# Patient Record
Sex: MALE | Race: WHITE | NOT HISPANIC OR LATINO | Employment: UNEMPLOYED | URBAN - METROPOLITAN AREA
[De-identification: names, ages, dates, MRNs, and addresses within clinical notes are randomized per-mention and may not be internally consistent; named-entity substitution may affect disease eponyms.]

---

## 2021-03-05 ENCOUNTER — IMMUNIZATION (OUTPATIENT)
Dept: PRIMARY CARE CLINIC | Facility: CLINIC | Age: 66
End: 2021-03-05

## 2021-03-05 DIAGNOSIS — Z23 NEED FOR VACCINATION: Primary | ICD-10-CM

## 2021-03-05 PROCEDURE — 91303 PR SARSCOV2 VAC AD26 .5ML IM: ICD-10-PCS | Mod: S$GLB,,, | Performed by: INTERNAL MEDICINE

## 2021-03-05 PROCEDURE — 91303 PR SARSCOV2 VAC AD26 .5ML IM: CPT | Mod: S$GLB,,, | Performed by: INTERNAL MEDICINE

## 2021-03-05 PROCEDURE — 0031A PR IMMUNIZ ADMIN, SARS-COV-2 COVID-19 VACC, 5X10VP/0.5ML: CPT | Mod: CV19,S$GLB,, | Performed by: INTERNAL MEDICINE

## 2021-03-05 PROCEDURE — 0031A PR IMMUNIZ ADMIN, SARS-COV-2 COVID-19 VACC, 5X10VP/0.5ML: ICD-10-PCS | Mod: CV19,S$GLB,, | Performed by: INTERNAL MEDICINE

## 2023-04-05 LAB — HBA1C MFR BLD: 8.2 % (ref 4–6)

## 2024-02-19 ENCOUNTER — DOCUMENTATION ONLY (OUTPATIENT)
Dept: GASTROENTEROLOGY | Facility: CLINIC | Age: 69
End: 2024-02-19
Payer: MEDICARE

## 2024-02-19 NOTE — PROGRESS NOTES
Patient referred by Dr. Brady Escamilla for symptomatic anemia due to bleeding intestinal angioectasias. He has a 2-3 year history of anemia requiring PRBC transfusion twice during that period. He is receiving oral iron therapy but not parenteral iron therapy. Despite iron supplementation, he admits to persistent fatigue and weakness associated with anemia. Due to the potential for benefit from additional treatment for angioectasias, I will re-evaluate his case in clinic this Wednesday, Feb 21st at 9:30am including discussion of TXA trial therapy.     Kj Zee MD   [facility]

## 2024-02-20 ENCOUNTER — TELEPHONE (OUTPATIENT)
Dept: GASTROENTEROLOGY | Facility: CLINIC | Age: 69
End: 2024-02-20
Payer: MEDICARE

## 2024-02-21 ENCOUNTER — LAB VISIT (OUTPATIENT)
Dept: LAB | Facility: HOSPITAL | Age: 69
End: 2024-02-21
Attending: INTERNAL MEDICINE
Payer: MEDICARE

## 2024-02-21 ENCOUNTER — OFFICE VISIT (OUTPATIENT)
Dept: GASTROENTEROLOGY | Facility: CLINIC | Age: 69
End: 2024-02-21
Payer: MEDICARE

## 2024-02-21 VITALS
BODY MASS INDEX: 38.21 KG/M2 | HEART RATE: 60 BPM | SYSTOLIC BLOOD PRESSURE: 125 MMHG | WEIGHT: 258 LBS | HEIGHT: 69 IN | DIASTOLIC BLOOD PRESSURE: 70 MMHG

## 2024-02-21 DIAGNOSIS — D50.0 IRON DEFICIENCY ANEMIA DUE TO CHRONIC BLOOD LOSS: Primary | ICD-10-CM

## 2024-02-21 DIAGNOSIS — D50.0 IRON DEFICIENCY ANEMIA DUE TO CHRONIC BLOOD LOSS: ICD-10-CM

## 2024-02-21 LAB
ALBUMIN SERPL BCP-MCNC: 3 G/DL (ref 3.5–5.2)
ALP SERPL-CCNC: 580 U/L (ref 55–135)
ALT SERPL W/O P-5'-P-CCNC: 16 U/L (ref 10–44)
ANION GAP SERPL CALC-SCNC: 9 MMOL/L (ref 8–16)
APTT PPP: 30.5 SEC (ref 21–32)
AST SERPL-CCNC: 17 U/L (ref 10–40)
BILIRUB SERPL-MCNC: 0.3 MG/DL (ref 0.1–1)
BUN SERPL-MCNC: 38 MG/DL (ref 8–23)
CALCIUM SERPL-MCNC: 8.9 MG/DL (ref 8.7–10.5)
CHLORIDE SERPL-SCNC: 107 MMOL/L (ref 95–110)
CO2 SERPL-SCNC: 21 MMOL/L (ref 23–29)
CREAT SERPL-MCNC: 1.6 MG/DL (ref 0.5–1.4)
ERYTHROCYTE [DISTWIDTH] IN BLOOD BY AUTOMATED COUNT: 13 % (ref 11.5–14.5)
EST. GFR  (NO RACE VARIABLE): 47 ML/MIN/1.73 M^2
FERRITIN SERPL-MCNC: 274 NG/ML (ref 20–300)
GLUCOSE SERPL-MCNC: 145 MG/DL (ref 70–110)
HCT VFR BLD AUTO: 31.1 % (ref 40–54)
HGB BLD-MCNC: 10.1 G/DL (ref 14–18)
INR PPP: 1.1 (ref 0.8–1.2)
IRON SERPL-MCNC: 66 UG/DL (ref 45–160)
MCH RBC QN AUTO: 30.6 PG (ref 27–31)
MCHC RBC AUTO-ENTMCNC: 32.5 G/DL (ref 32–36)
MCV RBC AUTO: 94 FL (ref 82–98)
PLATELET # BLD AUTO: 234 K/UL (ref 150–450)
PMV BLD AUTO: 9 FL (ref 9.2–12.9)
POTASSIUM SERPL-SCNC: 5.8 MMOL/L (ref 3.5–5.1)
PROT SERPL-MCNC: 7.9 G/DL (ref 6–8.4)
PROTHROMBIN TIME: 11.4 SEC (ref 9–12.5)
RBC # BLD AUTO: 3.3 M/UL (ref 4.6–6.2)
SATURATED IRON: 22 % (ref 20–50)
SODIUM SERPL-SCNC: 137 MMOL/L (ref 136–145)
TOTAL IRON BINDING CAPACITY: 306 UG/DL (ref 250–450)
TRANSFERRIN SERPL-MCNC: 207 MG/DL (ref 200–375)
WBC # BLD AUTO: 7.43 K/UL (ref 3.9–12.7)

## 2024-02-21 PROCEDURE — 1100F PTFALLS ASSESS-DOCD GE2>/YR: CPT | Mod: CPTII,S$GLB,, | Performed by: INTERNAL MEDICINE

## 2024-02-21 PROCEDURE — 3008F BODY MASS INDEX DOCD: CPT | Mod: CPTII,S$GLB,, | Performed by: INTERNAL MEDICINE

## 2024-02-21 PROCEDURE — 99203 OFFICE O/P NEW LOW 30 MIN: CPT | Mod: S$GLB,,, | Performed by: INTERNAL MEDICINE

## 2024-02-21 PROCEDURE — 3044F HG A1C LEVEL LT 7.0%: CPT | Mod: CPTII,S$GLB,, | Performed by: INTERNAL MEDICINE

## 2024-02-21 PROCEDURE — 3288F FALL RISK ASSESSMENT DOCD: CPT | Mod: CPTII,S$GLB,, | Performed by: INTERNAL MEDICINE

## 2024-02-21 PROCEDURE — 99999 PR PBB SHADOW E&M-EST. PATIENT-LVL III: CPT | Mod: PBBFAC,,, | Performed by: INTERNAL MEDICINE

## 2024-02-21 PROCEDURE — 85027 COMPLETE CBC AUTOMATED: CPT | Performed by: INTERNAL MEDICINE

## 2024-02-21 PROCEDURE — 3074F SYST BP LT 130 MM HG: CPT | Mod: CPTII,S$GLB,, | Performed by: INTERNAL MEDICINE

## 2024-02-21 PROCEDURE — 1125F AMNT PAIN NOTED PAIN PRSNT: CPT | Mod: CPTII,S$GLB,, | Performed by: INTERNAL MEDICINE

## 2024-02-21 PROCEDURE — 85730 THROMBOPLASTIN TIME PARTIAL: CPT | Performed by: INTERNAL MEDICINE

## 2024-02-21 PROCEDURE — 83540 ASSAY OF IRON: CPT | Performed by: INTERNAL MEDICINE

## 2024-02-21 PROCEDURE — 1159F MED LIST DOCD IN RCRD: CPT | Mod: CPTII,S$GLB,, | Performed by: INTERNAL MEDICINE

## 2024-02-21 PROCEDURE — 4010F ACE/ARB THERAPY RXD/TAKEN: CPT | Mod: CPTII,S$GLB,, | Performed by: INTERNAL MEDICINE

## 2024-02-21 PROCEDURE — 81241 F5 GENE: CPT | Performed by: INTERNAL MEDICINE

## 2024-02-21 PROCEDURE — 81240 F2 GENE: CPT | Performed by: INTERNAL MEDICINE

## 2024-02-21 PROCEDURE — 85610 PROTHROMBIN TIME: CPT | Performed by: INTERNAL MEDICINE

## 2024-02-21 PROCEDURE — 80053 COMPREHEN METABOLIC PANEL: CPT | Performed by: INTERNAL MEDICINE

## 2024-02-21 PROCEDURE — 82728 ASSAY OF FERRITIN: CPT | Performed by: INTERNAL MEDICINE

## 2024-02-21 PROCEDURE — 3078F DIAST BP <80 MM HG: CPT | Mod: CPTII,S$GLB,, | Performed by: INTERNAL MEDICINE

## 2024-02-21 NOTE — PROGRESS NOTES
"[facility]      HPI 68 y.o. male with multiple medical problems presents as a referral from Dr. Escamilla for chronic severe anemia which is symptomatic due to low hemoglobin levels ranging 7 gm-10gm/dl. He complaints of SOB and OGLESBY due to anemia which is improved when his hemoglobin is above 10 gm/dl. He is not currently requiring PRBC transfusions.     Past Medical History  Osteomyelitis   DM   HTN   Remote Afib in past   CKD stage 2-3   Neurogenic bladder s/p catheter  Chronic anemia >3 years in duration attributed to GI blood loss from angioectasias observed on VCE as well as anemia of chronic inflammation as well as CKD       Physical Examination  /70 (BP Location: Left arm, Patient Position: Sitting, BP Method: Medium (Automatic))   Pulse 60   Ht 5' 9" (1.753 m)   Wt 117 kg (258 lb)   BMI 38.10 kg/m²   General appearance: alert, cooperative, no distress  Lungs: clear to auscultation bilaterally, no dullness to percussion bilaterally  Heart: regular rate and rhythm without rub; no displacement of the PMI   Abdomen: soft, non-tender; bowel sounds normoactive; no organomegaly  Lab Results   Component Value Date    WBC 7.43 02/21/2024    HGB 10.1 (L) 02/21/2024    HCT 31.1 (L) 02/21/2024    MCV 94 02/21/2024     02/21/2024       Assessment:   Chronic anemia which is fairly severe and symptomatic due to small bowel angioectasias as well as anemia of chronic inflammation and anemia of CKD     Plan:  Lab today   TXA trial therapy (patient 12-31). Detailed informed consent obtained including review of risks/benefits/process of trial   Repeat CBC in 1 month, 2 month, 3 month       Kj Zee MD   [address] [place] [address], [address]  [place] [address] [phone]    "

## 2024-02-21 NOTE — PATIENT INSTRUCTIONS
Labs today, 1st floor [facility], [facility]    Labs in 1 month, 2 month, 3 months and 6 months.

## 2024-02-26 LAB
F2 C.20210G>A GENO BLD/T: NEGATIVE
F5 P.R506Q BLD/T QL: NEGATIVE

## 2024-03-20 ENCOUNTER — TELEPHONE (OUTPATIENT)
Dept: GASTROENTEROLOGY | Facility: CLINIC | Age: 69
End: 2024-03-20
Payer: MEDICARE

## 2024-03-20 DIAGNOSIS — D50.0 IRON DEFICIENCY ANEMIA DUE TO CHRONIC BLOOD LOSS: Primary | ICD-10-CM

## 2024-03-20 NOTE — TELEPHONE ENCOUNTER
----- Message from Faby Menendez sent at 3/20/2024  3:41 PM CDT -----  Needs advice from nurse:      Who Called:pt  Regarding:pt in a research program and is suppose to have lab work done every month, there are no orders  Would the patient rather a call back or VIA [facility]?  Best Call Back number:[phone]  Additional Info:

## 2024-03-20 NOTE — TELEPHONE ENCOUNTER
Pt requesting to schedule lab appt on tomorrow, 3/21/24.  Appt scheduled with pt on Thursday, March 21, 2024 at 10am at [facility].

## 2024-03-21 ENCOUNTER — TELEPHONE (OUTPATIENT)
Dept: GASTROENTEROLOGY | Facility: CLINIC | Age: 69
End: 2024-03-21
Payer: MEDICARE

## 2024-03-21 ENCOUNTER — LAB VISIT (OUTPATIENT)
Dept: LAB | Facility: HOSPITAL | Age: 69
End: 2024-03-21
Attending: INTERNAL MEDICINE
Payer: MEDICARE

## 2024-03-21 DIAGNOSIS — D50.0 IRON DEFICIENCY ANEMIA DUE TO CHRONIC BLOOD LOSS: Primary | ICD-10-CM

## 2024-03-21 DIAGNOSIS — D50.0 IRON DEFICIENCY ANEMIA DUE TO CHRONIC BLOOD LOSS: ICD-10-CM

## 2024-03-21 LAB
BASOPHILS # BLD AUTO: 0.05 K/UL (ref 0–0.2)
BASOPHILS NFR BLD: 0.6 % (ref 0–1.9)
DIFFERENTIAL METHOD BLD: ABNORMAL
EOSINOPHIL # BLD AUTO: 0.1 K/UL (ref 0–0.5)
EOSINOPHIL NFR BLD: 1.7 % (ref 0–8)
ERYTHROCYTE [DISTWIDTH] IN BLOOD BY AUTOMATED COUNT: 13.1 % (ref 11.5–14.5)
HCT VFR BLD AUTO: 29.8 % (ref 40–54)
HGB BLD-MCNC: 9.4 G/DL (ref 14–18)
IMM GRANULOCYTES # BLD AUTO: 0.1 K/UL (ref 0–0.04)
IMM GRANULOCYTES NFR BLD AUTO: 1.2 % (ref 0–0.5)
LYMPHOCYTES # BLD AUTO: 1.4 K/UL (ref 1–4.8)
LYMPHOCYTES NFR BLD: 16.9 % (ref 18–48)
MCH RBC QN AUTO: 30.8 PG (ref 27–31)
MCHC RBC AUTO-ENTMCNC: 31.5 G/DL (ref 32–36)
MCV RBC AUTO: 98 FL (ref 82–98)
MONOCYTES # BLD AUTO: 0.8 K/UL (ref 0.3–1)
MONOCYTES NFR BLD: 9.3 % (ref 4–15)
NEUTROPHILS # BLD AUTO: 5.8 K/UL (ref 1.8–7.7)
NEUTROPHILS NFR BLD: 70.3 % (ref 38–73)
NRBC BLD-RTO: 0 /100 WBC
PLATELET # BLD AUTO: 261 K/UL (ref 150–450)
PMV BLD AUTO: 8.5 FL (ref 9.2–12.9)
RBC # BLD AUTO: 3.05 M/UL (ref 4.6–6.2)
WBC # BLD AUTO: 8.24 K/UL (ref 3.9–12.7)

## 2024-03-21 PROCEDURE — 36415 COLL VENOUS BLD VENIPUNCTURE: CPT | Performed by: INTERNAL MEDICINE

## 2024-03-21 PROCEDURE — 85025 COMPLETE CBC W/AUTO DIFF WBC: CPT | Performed by: INTERNAL MEDICINE

## 2024-03-21 NOTE — TELEPHONE ENCOUNTER
Plan of Care    Patient with minor hemoglobin decline from 10.1 to 9.4. Will reassess in one month and have patient follow up in clinic in 2 months to further evaluate need for TXA. Patient notified of the above.

## 2024-03-21 NOTE — TELEPHONE ENCOUNTER
----- Message from Kj Zee MD sent at 3/21/2024  3:56 PM CDT -----  Regarding: Clinic appointment  Hi!    Can we get Mr. Mensah scheduled in clinic in 2 months and additionally I placed an order for a CBC in one month.     Thank you!

## 2024-03-21 NOTE — TELEPHONE ENCOUNTER
Lab appt scheduled with daughter on Monday, April 22, 10am at [facility].  Clinic follow up scheduled on Wednesday, May 22, 2024 at 9am.   Daughter to contact clinic if need to reschedule.

## 2024-04-23 ENCOUNTER — TELEPHONE (OUTPATIENT)
Dept: NEUROLOGY | Facility: CLINIC | Age: 69
End: 2024-04-23
Payer: MEDICARE

## 2024-04-24 ENCOUNTER — LAB VISIT (OUTPATIENT)
Dept: LAB | Facility: HOSPITAL | Age: 69
End: 2024-04-24
Attending: INTERNAL MEDICINE
Payer: MEDICARE

## 2024-04-24 DIAGNOSIS — D50.0 IRON DEFICIENCY ANEMIA DUE TO CHRONIC BLOOD LOSS: ICD-10-CM

## 2024-04-24 LAB
ERYTHROCYTE [DISTWIDTH] IN BLOOD BY AUTOMATED COUNT: 14.1 % (ref 11.5–14.5)
HCT VFR BLD AUTO: 25.2 % (ref 40–54)
HGB BLD-MCNC: 7.8 G/DL (ref 14–18)
MCH RBC QN AUTO: 29.7 PG (ref 27–31)
MCHC RBC AUTO-ENTMCNC: 31 G/DL (ref 32–36)
MCV RBC AUTO: 96 FL (ref 82–98)
PLATELET # BLD AUTO: 208 K/UL (ref 150–450)
PMV BLD AUTO: 8.3 FL (ref 9.2–12.9)
RBC # BLD AUTO: 2.63 M/UL (ref 4.6–6.2)
WBC # BLD AUTO: 6.59 K/UL (ref 3.9–12.7)

## 2024-04-24 PROCEDURE — 85027 COMPLETE CBC AUTOMATED: CPT | Performed by: INTERNAL MEDICINE

## 2024-04-24 PROCEDURE — 36415 COLL VENOUS BLD VENIPUNCTURE: CPT | Performed by: INTERNAL MEDICINE

## 2024-05-21 ENCOUNTER — TELEPHONE (OUTPATIENT)
Dept: GASTROENTEROLOGY | Facility: CLINIC | Age: 69
End: 2024-05-21
Payer: MEDICARE

## 2024-05-21 NOTE — TELEPHONE ENCOUNTER
Clinic reminder, message placed on voicemail.  Appt with Dr. Zee on Wednesday, May 22, 2024 at 9am, [facility], [address].   yes

## 2024-05-22 ENCOUNTER — OFFICE VISIT (OUTPATIENT)
Dept: GASTROENTEROLOGY | Facility: CLINIC | Age: 69
End: 2024-05-22
Payer: MEDICARE

## 2024-05-22 ENCOUNTER — LAB VISIT (OUTPATIENT)
Dept: LAB | Facility: HOSPITAL | Age: 69
End: 2024-05-22
Attending: INTERNAL MEDICINE
Payer: MEDICARE

## 2024-05-22 VITALS
DIASTOLIC BLOOD PRESSURE: 62 MMHG | SYSTOLIC BLOOD PRESSURE: 117 MMHG | HEART RATE: 72 BPM | BODY MASS INDEX: 38.21 KG/M2 | WEIGHT: 258 LBS | HEIGHT: 69 IN

## 2024-05-22 DIAGNOSIS — D50.0 IRON DEFICIENCY ANEMIA DUE TO CHRONIC BLOOD LOSS: Primary | ICD-10-CM

## 2024-05-22 DIAGNOSIS — D50.0 IRON DEFICIENCY ANEMIA DUE TO CHRONIC BLOOD LOSS: ICD-10-CM

## 2024-05-22 LAB
ERYTHROCYTE [DISTWIDTH] IN BLOOD BY AUTOMATED COUNT: 15.1 % (ref 11.5–14.5)
FERRITIN SERPL-MCNC: 1547 NG/ML (ref 20–300)
HCT VFR BLD AUTO: 22.1 % (ref 40–54)
HGB BLD-MCNC: 6.9 G/DL (ref 14–18)
IRON SERPL-MCNC: 33 UG/DL (ref 45–160)
MCH RBC QN AUTO: 29.4 PG (ref 27–31)
MCHC RBC AUTO-ENTMCNC: 31.2 G/DL (ref 32–36)
MCV RBC AUTO: 94 FL (ref 82–98)
PLATELET # BLD AUTO: 282 K/UL (ref 150–450)
PMV BLD AUTO: 9 FL (ref 9.2–12.9)
RBC # BLD AUTO: 2.35 M/UL (ref 4.6–6.2)
SATURATED IRON: 18 % (ref 20–50)
TOTAL IRON BINDING CAPACITY: 181 UG/DL (ref 250–450)
TRANSFERRIN SERPL-MCNC: 122 MG/DL (ref 200–375)
WBC # BLD AUTO: 6.3 K/UL (ref 3.9–12.7)

## 2024-05-22 PROCEDURE — 3044F HG A1C LEVEL LT 7.0%: CPT | Mod: CPTII,S$GLB,, | Performed by: INTERNAL MEDICINE

## 2024-05-22 PROCEDURE — 82728 ASSAY OF FERRITIN: CPT | Performed by: INTERNAL MEDICINE

## 2024-05-22 PROCEDURE — 99999 PR PBB SHADOW E&M-EST. PATIENT-LVL IV: CPT | Mod: PBBFAC,,, | Performed by: INTERNAL MEDICINE

## 2024-05-22 PROCEDURE — 3288F FALL RISK ASSESSMENT DOCD: CPT | Mod: CPTII,S$GLB,, | Performed by: INTERNAL MEDICINE

## 2024-05-22 PROCEDURE — 85027 COMPLETE CBC AUTOMATED: CPT | Performed by: INTERNAL MEDICINE

## 2024-05-22 PROCEDURE — 36415 COLL VENOUS BLD VENIPUNCTURE: CPT | Performed by: INTERNAL MEDICINE

## 2024-05-22 PROCEDURE — 3074F SYST BP LT 130 MM HG: CPT | Mod: CPTII,S$GLB,, | Performed by: INTERNAL MEDICINE

## 2024-05-22 PROCEDURE — 83540 ASSAY OF IRON: CPT | Performed by: INTERNAL MEDICINE

## 2024-05-22 PROCEDURE — 3078F DIAST BP <80 MM HG: CPT | Mod: CPTII,S$GLB,, | Performed by: INTERNAL MEDICINE

## 2024-05-22 PROCEDURE — 1159F MED LIST DOCD IN RCRD: CPT | Mod: CPTII,S$GLB,, | Performed by: INTERNAL MEDICINE

## 2024-05-22 PROCEDURE — 1100F PTFALLS ASSESS-DOCD GE2>/YR: CPT | Mod: CPTII,S$GLB,, | Performed by: INTERNAL MEDICINE

## 2024-05-22 PROCEDURE — 1126F AMNT PAIN NOTED NONE PRSNT: CPT | Mod: CPTII,S$GLB,, | Performed by: INTERNAL MEDICINE

## 2024-05-22 PROCEDURE — 4010F ACE/ARB THERAPY RXD/TAKEN: CPT | Mod: CPTII,S$GLB,, | Performed by: INTERNAL MEDICINE

## 2024-05-22 PROCEDURE — 99214 OFFICE O/P EST MOD 30 MIN: CPT | Mod: S$GLB,,, | Performed by: INTERNAL MEDICINE

## 2024-05-22 PROCEDURE — 3008F BODY MASS INDEX DOCD: CPT | Mod: CPTII,S$GLB,, | Performed by: INTERNAL MEDICINE

## 2024-05-22 RX ORDER — PANTOPRAZOLE SODIUM 40 MG/1
40 TABLET, DELAYED RELEASE ORAL DAILY
COMMUNITY

## 2024-05-22 RX ORDER — INSULIN ASPART 100 [IU]/ML
INJECTION, SOLUTION INTRAVENOUS; SUBCUTANEOUS
COMMUNITY

## 2024-05-22 RX ORDER — AMLODIPINE BESYLATE 10 MG/1
10 TABLET ORAL DAILY
COMMUNITY

## 2024-05-22 RX ORDER — ASPIRIN 81 MG/1
81 TABLET ORAL DAILY
COMMUNITY

## 2024-05-22 RX ORDER — DORZOLAMIDE HCL 20 MG/ML
1 SOLUTION/ DROPS OPHTHALMIC 3 TIMES DAILY
COMMUNITY

## 2024-05-22 RX ORDER — LANOLIN ALCOHOL/MO/W.PET/CERES
1 CREAM (GRAM) TOPICAL
COMMUNITY

## 2024-05-22 RX ORDER — ERGOCALCIFEROL 1.25 MG/1
50000 CAPSULE ORAL
Status: ON HOLD | COMMUNITY
End: 2024-06-03 | Stop reason: CLARIF

## 2024-05-22 RX ORDER — DOXAZOSIN 2 MG/1
2 TABLET ORAL NIGHTLY
COMMUNITY

## 2024-05-22 RX ORDER — BRIMONIDINE TARTRATE 1 MG/ML
1 SOLUTION/ DROPS OPHTHALMIC 3 TIMES DAILY
COMMUNITY

## 2024-05-22 RX ORDER — METOPROLOL TARTRATE 100 MG/1
100 TABLET ORAL 2 TIMES DAILY
COMMUNITY

## 2024-05-22 RX ORDER — LOSARTAN POTASSIUM 25 MG/1
25 TABLET ORAL DAILY
COMMUNITY

## 2024-05-22 RX ORDER — ATORVASTATIN CALCIUM 10 MG/1
10 TABLET, FILM COATED ORAL DAILY
COMMUNITY

## 2024-05-22 NOTE — PATIENT INSTRUCTIONS
Labs today, 1st floor [facility], Patient [facility].    You are scheduled for an Upper SBE on _____Monday, Miranda 3, 2024 at [facility] at [address] [place] [address]  700____________________________    You should eat light meals the day before the procedure and nothing to eat or drink after midnight the night before your procedure.    You will need to be at the 1st floor admission desk at the hospital on __Endoscopy staff will contact to give arrival time.____________________

## 2024-05-22 NOTE — PROGRESS NOTES
"[facility]    Chief complaint: Anemia     HPI 69 y.o. male with multiple medical problems presented as a referral from Dr. Escamilla for chronic severe anemia which is symptomatic due to low hemoglobin levels ranging 7 gm-10gm/dl. He complains of SOB and OGLESBY due to anemia which is improved when his hemoglobin is above 10 gm/dl. He has not required any PRBC transfusions since starting the TXA trial. He remains on oral iron every other day. He has some issues with constipation that responds well to Miralax.     Past Medical History  Osteomyelitis   DM   HTN   Remote Afib in past   CKD stage 2-3   Neurogenic bladder s/p catheter  Chronic anemia >3 years in duration attributed to GI blood loss from angioectasias observed on VCE as well as anemia of chronic inflammation as well as CKD       Physical Examination  /62 (BP Location: Left arm, Patient Position: Sitting, BP Method: Large (Automatic))   Pulse 72   Ht 5' 9" (1.753 m)   Wt 117 kg (258 lb)   BMI 38.10 kg/m²   General appearance: alert, cooperative, no distress  Lungs: clear to auscultation bilaterally, no dullness to percussion bilaterally  Heart: regular rate and rhythm without rub; no displacement of the PMI   Abdomen: soft, non-tender; bowel sounds normoactive; no organomegaly    Labs:   Hgb 10.1 (2/21/24) -> Hgb 9.4 (3/21/2024) -> Hgb 7.8 (4/24/2024)  Ferritin 274 (2/21/24)                                    Ferritin 329 (4/9/2024)    Assessment:   Mr. Downs is a 69 year old male with symptomatic, severe chronic anemia due to small bowel angioectasias as well as anemia of chronic inflammation and anemia of CKD.  He has not required any blood transfusion since starting TXA but hemoglobin has down trended over the past 3 months which suggest he is a non-responder to TXA. (This is a chronic illness with exacerbation)    Plan:  - TXA trial therapy (patient 12-31, start date 2/21/2024). He will continue the remaining TXA (approximately 10 days " left)  - Upper Single Balloon Enteroscopy (6/3/24) with ablation of angioectasias  - Continue oral iron therapy with addition of parenteral iron if needed after SBE followed by a trial of Sandostatin 30mg IM monthly if able to get approved by insurance (previously unsuccessful)        Kj Zee MD   [address] [place] [address], [address]  [place] [address] [phone]

## 2024-06-03 ENCOUNTER — HOSPITAL ENCOUNTER (OUTPATIENT)
Facility: HOSPITAL | Age: 69
Discharge: HOME | End: 2024-06-03
Attending: INTERNAL MEDICINE | Admitting: INTERNAL MEDICINE
Payer: MEDICARE

## 2024-06-03 ENCOUNTER — ANESTHESIA (OUTPATIENT)
Dept: ENDOSCOPY | Facility: HOSPITAL | Age: 69
End: 2024-06-03
Payer: MEDICARE

## 2024-06-03 ENCOUNTER — ANESTHESIA EVENT (OUTPATIENT)
Dept: ENDOSCOPY | Facility: HOSPITAL | Age: 69
End: 2024-06-03
Payer: MEDICARE

## 2024-06-03 VITALS
DIASTOLIC BLOOD PRESSURE: 67 MMHG | WEIGHT: 255 LBS | HEART RATE: 95 BPM | HEIGHT: 69 IN | OXYGEN SATURATION: 96 % | BODY MASS INDEX: 37.77 KG/M2 | SYSTOLIC BLOOD PRESSURE: 114 MMHG | RESPIRATION RATE: 14 BRPM | TEMPERATURE: 98 F

## 2024-06-03 DIAGNOSIS — D64.9 ANEMIA: ICD-10-CM

## 2024-06-03 DIAGNOSIS — D50.0 ANEMIA DUE TO GASTROINTESTINAL BLOOD LOSS: Primary | ICD-10-CM

## 2024-06-03 LAB
BASOPHILS # BLD AUTO: 0.03 K/UL (ref 0–0.2)
BASOPHILS NFR BLD: 0.5 % (ref 0–1.9)
DIFFERENTIAL METHOD BLD: ABNORMAL
EOSINOPHIL # BLD AUTO: 0.2 K/UL (ref 0–0.5)
EOSINOPHIL NFR BLD: 2.9 % (ref 0–8)
ERYTHROCYTE [DISTWIDTH] IN BLOOD BY AUTOMATED COUNT: 15.3 % (ref 11.5–14.5)
HCT VFR BLD AUTO: 23.5 % (ref 40–54)
HGB BLD-MCNC: 7.1 G/DL (ref 14–18)
IMM GRANULOCYTES # BLD AUTO: 0.09 K/UL (ref 0–0.04)
IMM GRANULOCYTES NFR BLD AUTO: 1.4 % (ref 0–0.5)
LYMPHOCYTES # BLD AUTO: 1.5 K/UL (ref 1–4.8)
LYMPHOCYTES NFR BLD: 23.8 % (ref 18–48)
MCH RBC QN AUTO: 28.7 PG (ref 27–31)
MCHC RBC AUTO-ENTMCNC: 30.2 G/DL (ref 32–36)
MCV RBC AUTO: 95 FL (ref 82–98)
MONOCYTES # BLD AUTO: 0.6 K/UL (ref 0.3–1)
MONOCYTES NFR BLD: 8.9 % (ref 4–15)
NEUTROPHILS # BLD AUTO: 3.9 K/UL (ref 1.8–7.7)
NEUTROPHILS NFR BLD: 62.5 % (ref 38–73)
NRBC BLD-RTO: 0 /100 WBC
PLATELET # BLD AUTO: 180 K/UL (ref 150–450)
PMV BLD AUTO: 8.7 FL (ref 9.2–12.9)
POCT GLUCOSE: 138 MG/DL (ref 70–110)
RBC # BLD AUTO: 2.47 M/UL (ref 4.6–6.2)
WBC # BLD AUTO: 6.3 K/UL (ref 3.9–12.7)

## 2024-06-03 PROCEDURE — 27202087 HC PROBE, APC: Performed by: INTERNAL MEDICINE

## 2024-06-03 PROCEDURE — 63600175 PHARM REV CODE 636 W HCPCS: Performed by: NURSE ANESTHETIST, CERTIFIED REGISTERED

## 2024-06-03 PROCEDURE — 25000003 PHARM REV CODE 250: Performed by: NURSE ANESTHETIST, CERTIFIED REGISTERED

## 2024-06-03 PROCEDURE — 25000003 PHARM REV CODE 250: Performed by: INTERNAL MEDICINE

## 2024-06-03 PROCEDURE — 27201238 HC BALLOON, OVERTUBE (ANY): Performed by: INTERNAL MEDICINE

## 2024-06-03 PROCEDURE — 37000008 HC ANESTHESIA 1ST 15 MINUTES: Performed by: INTERNAL MEDICINE

## 2024-06-03 PROCEDURE — D9220A PRA ANESTHESIA: Mod: ,,, | Performed by: NURSE ANESTHETIST, CERTIFIED REGISTERED

## 2024-06-03 PROCEDURE — 85025 COMPLETE CBC W/AUTO DIFF WBC: CPT | Performed by: ANESTHESIOLOGY

## 2024-06-03 PROCEDURE — 44378 SMALL BOWEL ENDOSCOPY: CPT | Performed by: INTERNAL MEDICINE

## 2024-06-03 PROCEDURE — 36415 COLL VENOUS BLD VENIPUNCTURE: CPT | Performed by: ANESTHESIOLOGY

## 2024-06-03 PROCEDURE — 44799 UNLISTED PX SMALL INTESTINE: CPT | Performed by: INTERNAL MEDICINE

## 2024-06-03 PROCEDURE — 37000009 HC ANESTHESIA EA ADD 15 MINS: Performed by: INTERNAL MEDICINE

## 2024-06-03 RX ORDER — LIDOCAINE HYDROCHLORIDE 20 MG/ML
INJECTION, SOLUTION EPIDURAL; INFILTRATION; INTRACAUDAL; PERINEURAL
Status: DISCONTINUED | OUTPATIENT
Start: 2024-06-03 | End: 2024-06-03

## 2024-06-03 RX ORDER — PROPOFOL 10 MG/ML
VIAL (ML) INTRAVENOUS
Status: DISCONTINUED | OUTPATIENT
Start: 2024-06-03 | End: 2024-06-03

## 2024-06-03 RX ORDER — SODIUM CHLORIDE 0.9 % (FLUSH) 0.9 %
10 SYRINGE (ML) INJECTION
Status: DISCONTINUED | OUTPATIENT
Start: 2024-06-03 | End: 2024-06-03 | Stop reason: HOSPADM

## 2024-06-03 RX ORDER — SODIUM CHLORIDE 9 MG/ML
INJECTION, SOLUTION INTRAVENOUS CONTINUOUS
Status: DISCONTINUED | OUTPATIENT
Start: 2024-06-03 | End: 2024-06-03 | Stop reason: HOSPADM

## 2024-06-03 RX ADMIN — SODIUM CHLORIDE: 9 INJECTION, SOLUTION INTRAVENOUS at 08:06

## 2024-06-03 RX ADMIN — PROPOFOL 70 MG: 10 INJECTION, EMULSION INTRAVENOUS at 09:06

## 2024-06-03 RX ADMIN — PROPOFOL 40 MG: 10 INJECTION, EMULSION INTRAVENOUS at 09:06

## 2024-06-03 RX ADMIN — SODIUM CHLORIDE, SODIUM LACTATE, POTASSIUM CHLORIDE, AND CALCIUM CHLORIDE: .6; .31; .03; .02 INJECTION, SOLUTION INTRAVENOUS at 09:06

## 2024-06-03 RX ADMIN — PROPOFOL 30 MG: 10 INJECTION, EMULSION INTRAVENOUS at 09:06

## 2024-06-03 RX ADMIN — LIDOCAINE HYDROCHLORIDE 100 MG: 20 INJECTION, SOLUTION EPIDURAL; INFILTRATION; INTRACAUDAL; PERINEURAL at 09:06

## 2024-06-03 NOTE — TRANSFER OF CARE
"Anesthesia Transfer of Care Note    Patient: Rodrick Pearl    Procedure(s) Performed: Procedure(s) (LRB):  ENTEROSCOPY, PROXIMAL (N/A)    Patient location: GI    Anesthesia Type: general    Transport from OR: Transported from OR on room air with adequate spontaneous ventilation    Post pain: adequate analgesia    Post assessment: no apparent anesthetic complications and tolerated procedure well    Post vital signs: stable    Level of consciousness: awake    Nausea/Vomiting: no nausea/vomiting    Complications: none    Transfer of care protocol was followed      Last vitals: Visit Vitals  BP (!) 146/65 (BP Location: Left arm, Patient Position: Lying)   Pulse 67   Temp 36.8 °C (98.2 °F) (Temporal)   Resp 16   Ht 5' 9" (1.753 m)   Wt 115.7 kg (255 lb)   SpO2 95%   BMI 37.66 kg/m²     "

## 2024-06-03 NOTE — PROVATION PATIENT INSTRUCTIONS
Discharge Summary/Instructions after an Endoscopic Procedure  Patient Name: Rodrick Downs  Patient MRN: [id number]  Patient YOB: 1955  Monday, Miranda 3, 2024  Kj Zee MD  Dear patient,  As a result of recent federal legislation (The Federal Cures Act), you may   receive lab or pathology results from your procedure in your [facility]   account before your physician is able to contact you. Your physician or   their representative will relay the results to you with their   recommendations at their soonest availability.  Thank you,  Your health is very important to us during the Covid Crisis. Following your   procedure today, you will receive a daily text for 2 weeks asking about   signs or symptoms of Covid 19.  Please respond to this text when you   receive it so we can follow up and keep you as safe as possible.   RESTRICTIONS:  During your procedure today, you received medications for sedation.  These   medications may affect your judgment, balance and coordination.  Therefore,   for 24 hours, you have the following restrictions:   - DO NOT drive a car, operate machinery, make legal/financial decisions,   sign important papers or drink alcohol.    ACTIVITY:  Today: no heavy lifting, straining or running due to procedural   sedation/anesthesia.  The following day: return to full activity including work.  DIET:  Eat and drink normally unless instructed otherwise.     TREATMENT FOR COMMON SIDE EFFECTS:  - Mild abdominal pain, nausea, belching, bloating or excessive gas:  rest,   eat lightly and use a heating pad.  - Sore Throat: treat with throat lozenges and/or gargle with warm salt   water.  - Because air was used during the procedure, expelling large amounts of air   from your rectum or belching is normal.  - If a bowel prep was taken, you may not have a bowel movement for 1-3 days.    This is normal.  SYMPTOMS TO WATCH FOR AND REPORT TO YOUR PHYSICIAN:  1. Abdominal pain or bloating, other than gas  cramps.  2. Chest pain.  3. Back pain.  4. Signs of infection such as: chills or fever occurring within 24 hours   after the procedure.  5. Rectal bleeding, which would show as bright red, maroon, or black stools.   (A tablespoon of blood from the rectum is not serious, especially if   hemorrhoids are present.)  6. Vomiting.  7. Weakness or dizziness.  GO DIRECTLY TO THE NEAREST EMERGENCY ROOM IF YOU HAVE ANY OF THE FOLLOWING:      Difficulty breathing              Chills and/or fever over 101 F   Persistent vomiting and/or vomiting blood   Severe abdominal pain   Severe chest pain   Black, tarry stools   Bleeding- more than one tablespoon   Any other symptom or condition that you feel may need urgent attention  Your doctor recommends these additional instructions:  If any biopsies were taken, your doctors clinic will contact you in 1 to 2   weeks with any results.  - Initiate procrit injections per nephrology if not already done   - Hematology evaluation prior to any additional GI testing   - Potential future trial of empiric Octreotide if no improvement with   procrit or after hematology evaluation  - Discharge to home  - Resume previous diet and medications  - Condition stable   - The signs and symptoms of potential delayed complications were discussed   with the patient. If signs or symptoms of these complications develop, call   the [facility] at [phone].   - Return to normal activities tomorrow.  Written discharge instructions were   provided to the patient.  For questions, problems or results please call your physician - Kj Zee MD.  EMERGENCY PHONE NUMBER: [phone],  LAB RESULTS: [phone]  IF A COMPLICATION OR EMERGENCY SITUATION ARISES AND YOU ARE UNABLE TO REACH   YOUR PHYSICIAN - GO DIRECTLY TO THE EMERGENCY ROOM.  MD Kj Ling MD  6/3/2024 10:01:26 AM  This report has been verified and signed electronically.  Dear patient,  As a result of  recent federal legislation (The Federal Cures Act), you may   receive lab or pathology results from your procedure in your [facility]   account before your physician is able to contact you. Your physician or   their representative will relay the results to you with their   recommendations at their soonest availability.  Thank you,  PROVATION

## 2024-06-03 NOTE — ANESTHESIA PREPROCEDURE EVALUATION
06/03/2024  Rodrick Pearl is a 69 y.o., male.    No past medical history on file.  No past surgical history on file.        Pre-op Assessment    I have reviewed the Patient Summary Reports.     I have reviewed the Nursing Notes. I have reviewed the NPO Status.      Review of Systems  Anesthesia Hx:   History of prior surgery of interest to airway management or planning:            Denies Personal Hx of Anesthesia complications.                    Hematology/Oncology:       -- Anemia (H/H 6.9/22.3):                                  Cardiovascular:  Exercise tolerance: good                                           Pulmonary:  Pulmonary Normal                       Renal/:     Neurogenic bladder             Endocrine:        Obesity / BMI > 30      Physical Exam  General: Well nourished    Airway:  Mallampati: II   Mouth Opening: Normal  Neck ROM: Normal ROM        Anesthesia Plan  Type of Anesthesia, risks & benefits discussed:    Anesthesia Type: Gen Natural Airway  Informed Consent: Informed consent signed with the Patient and all parties understand the risks and agree with anesthesia plan.  All questions answered.   ASA Score: 3    Ready For Surgery From Anesthesia Perspective.     .

## 2024-06-03 NOTE — ANESTHESIA POSTPROCEDURE EVALUATION
Anesthesia Post Evaluation    Patient: Rodrick Pearl    Procedure(s) Performed: Procedure(s) (LRB):  ENTEROSCOPY, PROXIMAL (N/A)    Final Anesthesia Type: general      Patient location during evaluation: PACU  Patient participation: Yes- Able to Participate  Level of consciousness: awake and alert  Post-procedure vital signs: reviewed and stable  Pain management: adequate  Airway patency: patent    PONV status at discharge: No PONV  Anesthetic complications: no      Cardiovascular status: blood pressure returned to baseline  Respiratory status: unassisted  Hydration status: euvolemic                Vitals Value Taken Time   /67 06/03/24 1028   Temp 36.7 °C (98 °F) 06/03/24 0936   Pulse 95 06/03/24 1028   Resp 14 06/03/24 1028   SpO2 96 % 06/03/24 1028         Event Time   Out of Recovery 10:05:51         Pain/Gonzalo Score: Gonzalo Score: 10 (6/3/2024  9:53 AM)

## 2024-06-03 NOTE — H&P
[facility]    Chief complaint: Anemia     HPI 69 y.o. male with multiple medical problems presented as a referral from Dr. Escamilla for chronic severe anemia which is symptomatic due to low hemoglobin levels ranging 7 gm-10gm/dl. He complains of SOB and OGLESBY due to anemia which is improved when his hemoglobin is above 10 gm/dl. He has not required any PRBC transfusions since starting the TXA trial. He remains on oral iron every other day. He has some issues with constipation that responds well to Miralax.     Past Medical History  Osteomyelitis   DM   HTN   Remote Afib in past   CKD stage 2-3   Neurogenic bladder s/p catheter  Chronic anemia >3 years in duration attributed to GI blood loss from angioectasias observed on VCE as well as anemia of chronic inflammation as well as CKD       Physical Examination  General appearance: alert, cooperative, no distress  Abdomen: soft, non-tender; bowel sounds normoactive; no organomegaly    Labs:   Hgb 10.1 (2/21/24) -> Hgb 9.4 (3/21/2024) -> Hgb 7.8 (4/24/2024)  Ferritin 274 (2/21/24)                                    Ferritin 329 (4/9/2024)    Assessment:   Mr. Downs is a 69 year old male with symptomatic, severe chronic anemia due to small bowel angioectasias as well as anemia of chronic inflammation and anemia of CKD.  He has not required any blood transfusion since starting TXA but hemoglobin has down trended over the past 3 months which suggest he is a non-responder to TXA. (This is a chronic illness with exacerbation)    Plan:  - TXA trial therapy (patient 12-31, start date 2/21/2024). He will continue the remaining TXA (approximately 10 days left)  - Upper Single Balloon Enteroscopy with ablation of angioectasias  - Continue oral iron therapy with addition of parenteral iron if needed after SBE followed by a trial of Sandostatin 30mg IM monthly if able to get approved by insurance (previously unsuccessful)        Kj Zee MD   [address] [place] [address]  [address]  [place] [address]   [phone]

## 2024-06-05 ENCOUNTER — PATIENT MESSAGE (OUTPATIENT)
Dept: GASTROENTEROLOGY | Facility: CLINIC | Age: 69
End: 2024-06-05
Payer: MEDICARE

## 2024-06-07 ENCOUNTER — TELEPHONE (OUTPATIENT)
Dept: GASTROENTEROLOGY | Facility: CLINIC | Age: 69
End: 2024-06-07
Payer: MEDICARE

## 2024-06-07 NOTE — TELEPHONE ENCOUNTER
----- Message from Yeyo Jang sent at 6/3/2024  1:10 PM CDT -----  Contact: pt  .Type:  Needs Medical Advice    Who Called: pt wife Naomi    Would the patient rather a call back or a response via [facility]?  Call back   Best Call Back Number: [phone]  Additional Information: pt. Wife is calling regarding the pt. Was told to go see a  Dr. Juliana Garcias nephrologists.  Pt. Wife was told to tell the office that they need recent clinic notes, labs, and results from the procedure that was done today.  Please fax to [phone]

## 2024-06-10 PROBLEM — D50.0 ANEMIA DUE TO GASTROINTESTINAL BLOOD LOSS: Status: ACTIVE | Noted: 2024-06-10

## 2024-06-11 ENCOUNTER — TELEPHONE (OUTPATIENT)
Dept: GASTROENTEROLOGY | Facility: CLINIC | Age: 69
End: 2024-06-11
Payer: MEDICARE

## 2024-06-11 NOTE — TELEPHONE ENCOUNTER
----- Message from Haylee Salazar sent at 6/11/2024 10:24 AM CDT -----  Type:  Progress Notes, Bloodwork, and Referral     Who Called: Pt's wife   Would the patient rather a call back or a response via [facility]? Call back   Best Call Back Number: [phone]  Additional Information: Please be advised, caller states that dr who was supposed to receive information regarding pt hasn't received anything, and caller states that pt can't wait any longer for referral, progress notes, and bloodwork from procedure to be sent over and would like a call back as soon as possible

## 2024-06-11 NOTE — TELEPHONE ENCOUNTER
Call to Dr. Mayberry's office to obtain status of referral faxed to his office.  Per Sanjuanita, referral received but no dx or notes received.  Informed Sanjuanita, notes and dx faxed all together.  Referral refaxed to Sanjuanita at [phone].  Sanjuanita to respond when fax received.

## 2024-06-11 NOTE — TELEPHONE ENCOUNTER
Naomi, wife, requesting referring records to be sent to Dr. Mayberry.  Informed  referral sent to Dr. Mayberry on 6/10/24, per Dr. Mayberry's staff, all records not received.  Wife notified referral refaxed to Dr. Mayberry's office and his staff will notify this office when received.  Acknowledged understanding by wife.

## 2024-07-19 ENCOUNTER — TELEPHONE (OUTPATIENT)
Dept: HEMATOLOGY/ONCOLOGY | Facility: CLINIC | Age: 69
End: 2024-07-19
Payer: MEDICARE

## 2024-07-19 NOTE — NURSING
Nurse navigator spoke with patient 's wife for coordination of appts for patients cancer care.  Patient is self referral from [facility] and would like to transfer his care to [facility] the [facility].  Scheduled patient with first available appt with Dr. Johnny Harris Med/Onc on 7/24/24 at 4:00.  Patients wife states understanding of appt date, time and location.  Also on [facility] portal.  All questions answered and contact info given should any future questions arise.

## 2024-07-23 NOTE — PROGRESS NOTES
[facility]: New patient visit  Best Contact Phone Number(s): [phone] (home)       Cancer/Stage/TNM:    Cancer Staging   No matching staging information was found for the patient.        Reason for visit:  de divya higher volume metastatic prostate cancer    Molecular:  Germline Testing: N/A  Somatic Testing: N/A    Treatment History:   07/18/24 - Degarelix     HPI:   Rodrick Pearl is a 69 y.o. male found to have de divya high volume metastatic prostate cancer in the setting of acute on chronic anemia and abdominal pain with associated malaise and weight loss. He has been managed primarily at [facility] and started degarelix 07/18/24. He presents to medical oncology clinic for second opinion.    Over the last few days feels better than recent baseline. He has been having ongoing generalized queasiness and malaise over several years in the setting of multiple orthopedic issues. Notably had pathologic fracture of right femur in Autumn 2023. Has had ongoing intermittent constipation and diarrhea along with nausea and weight loss. Overall has lost about 40 pounds. No CP or SOB. Had chronic dry cough. No current bone or back pain.     He started degarelix 07/18/24 and received enzlutamide in the mail today. Plans to start tomorrow after education class at [facility] tomorrow.     Long standing history of multiple orthopedic issues to the rightle and is wheelchair bound. Multiple other chronic medical comorbidities. He had neurogenic bladder with suprapubic tube placement since 2022. He has CKD.  He has remote history of diabetic wound with associated osteomyelitis with multiple partial amputations of his bilateral feet. 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 He takes amlodipine, losartan, and metoprolol for HTN. He is also on doxazosin. He takes oral iron and patnoprazole. No known prior MI or stroke; has had prior episodes of pAF.      Oncology  History   Prostate cancer   10/2022 Notable Event    10/2022: New onset urinary retention following knee surgery. Ultimately rquired supraubic catheter placement 11/2022 in setting of distal penile necrosis.      2/2024 Notable Event    02/2024: Acute on chronic anemia; primarily workup up by GI     6/3/2024 Procedure    06/03/2024: Push enteroscopy - argon plasma coagulation of AVM in mid jejunum. Referrred to hematology for low blood counts     6/27/2024 Imaging Significant Findings         6/27/2024 Tumor Markers    06/27/2024: .4     7/8/2024 Biopsy         7/11/2024 Imaging Significant Findings    FDG PET CT         7/16/2024 Imaging Significant Findings         7/18/2024 -  Hormone Therapy    07/18/24: Started ADT degarelix     7/24/2024 Initial Diagnosis    Prostate cancer           Past Medical History:   Diagnosis Date    Diabetes mellitus     Hypertension          Past Surgical History:   Procedure Laterality Date    ENDOSCOPY OF PROXIMAL SMALL INTESTINE N/A 6/3/2024    Procedure: ENTEROSCOPY, PROXIMAL;  Surgeon: Kj Zee MD;  Location: [facility];  Service: Endoscopy;  Laterality: N/A;  upper SBE    FEMUR FRACTURE SURGERY Right     TOE AMPUTATION Left     Great toe    TOE AMPUTATION Right     little toe    TONSILLECTOMY      TOTAL KNEE ARTHROPLASTY Right          Review of patient's allergies indicates:   Allergen Reactions    Rhopressa [netarsudil] Other (See Comments)         Current Outpatient Medications   Medication Sig Dispense Refill    amLODIPine (NORVASC) 10 MG tablet Take 10 mg by mouth once daily.      aspirin (ECOTRIN) 81 MG EC tablet Take 81 mg by mouth once daily.      atorvastatin (LIPITOR) 10 MG tablet Take 10 mg by mouth once daily.      brimonidine 0.1% (ALPHAGAN P) 0.1 % Drop Place 1 drop into both eyes 3 (three) times daily.      dorzolamide (TRUSOPT) 2 % ophthalmic solution 1 drop 3 (three) times daily.      doxazosin (CARDURA) 2 MG tablet Take 2 mg by mouth every  evening.      empagliflozin (JARDIANCE) 25 mg tablet Take 25 mg by mouth once daily.      ferrous sulfate (FEOSOL) Tab tablet Take 1 tablet by mouth daily with breakfast.      insulin aspart U-100 (NOVOLOG) 100 unit/mL injection Inject into the skin 3 (three) times daily before meals.      insulin degludec (TRESIBA FLEXTOUCH U-200 SUBQ) Inject into the skin.      losartan (COZAAR) 25 MG tablet Take 25 mg by mouth once daily.      metoprolol tartrate (LOPRESSOR) 100 MG tablet Take 100 mg by mouth 2 (two) times daily.      pantoprazole (PROTONIX) 40 MG tablet Take 40 mg by mouth once daily.       No current facility-administered medications for this visit.        Objective:      Physical Exam:   BP (!) 123/58 (BP Location: Left arm, Patient Position: Sitting, BP Method: Large (Automatic))   Pulse 67   Temp 97 °F (36.1 °C) (Temporal)   Wt 95.5 kg (210 lb 8 oz)   SpO2 99%   BMI 31.09 kg/m²       ECOG Performance status: (3) Capable of limited self-care, confined to bed or chair > 50% of waking hours     Physical Exam  Constitutional:       General: He is not in acute distress.     Appearance: Normal appearance.      Comments: Presents in wheel chair   HENT:      Head: Normocephalic.   Eyes:      General: No scleral icterus.     Extraocular Movements: Extraocular movements intact.      Conjunctiva/sclera: Conjunctivae normal.   Cardiovascular:      Rate and Rhythm: Normal rate.      Heart sounds: No murmur heard.  Pulmonary:      Effort: Pulmonary effort is normal. No respiratory distress.   Abdominal:      General: There is no distension.      Palpations: Abdomen is soft.   Genitourinary:     Comments: SPT in place  Skin:     General: Skin is warm and dry.   Neurological:      Mental Status: He is alert and oriented to person, place, and time.      Motor: Weakness present.   Psychiatric:         Mood and Affect: Mood normal.         Behavior: Behavior normal.         Thought Content: Thought content normal.         "  Recent Labs:   Lab Results   Component Value Date    WBC 6.30 06/03/2024    RBC 2.47 (L) 06/03/2024    HGB 7.1 (L) 06/03/2024    HCT 23.5 (L) 06/03/2024    MCV 95 06/03/2024    MCH 28.7 06/03/2024    MCHC 30.2 (L) 06/03/2024    RDW 15.3 (H) 06/03/2024     06/03/2024    MPV 8.7 (L) 06/03/2024    IMMGR 1.4 (H) 06/03/2024    GRAN 3.9 06/03/2024    GRAN 62.5 06/03/2024    IGABS 0.09 (H) 06/03/2024    LYMPH 1.5 06/03/2024    LYMPH 23.8 06/03/2024    MONO 0.6 06/03/2024    MONO 8.9 06/03/2024    EOS 0.2 06/03/2024    BASO 0.03 06/03/2024    NRBC 0 06/03/2024    EOSINOPHIL 2.9 06/03/2024    BASOPHIL 0.5 06/03/2024    DIFFMETHOD Automated 06/03/2024       Lab Results   Component Value Date     02/21/2024    K 5.2 07/20/2024     02/21/2024    CO2 21 (L) 02/21/2024     (H) 02/21/2024    BUN 38 (H) 02/21/2024    CREATININE 1.6 (H) 02/21/2024    CALCIUM 8.9 02/21/2024    PROT 7.9 02/21/2024    ALBUMIN 3.0 (L) 02/21/2024    BILITOT 0.3 02/21/2024    ALKPHOS 580 (H) 02/21/2024    AST 17 02/21/2024    ALT 16 02/21/2024    ANIONGAP 9 02/21/2024    EGFRNORACEVR 47 (A) 02/21/2024        No results found for: "PSADIAG", "PSATOTAL", "PSA"     Cardiovascular Screening:  Primary care physician: Asif Hall MD      The ASCVD Risk score (Taco WILSON, et al., 2019) failed to calculate for the following reasons:    The valid total cholesterol range is 130 to 320 mg/dL    ASCVD Risk Level: N/A    EKG: No results found for this or any previous visit.    High blood pressure:  Antihypertensive agents: amLODIPine - 10 MG  doxazosin - 2 MG  losartan - 25 MG  metoprolol tartrate - 100 MG      DM2:  Antidiabetic agents: empagliflozin - 25 mg  insulin aspart U-100 - 100 unit/mL  TRESIBA FLEXTOUCH U-200 SUBQ      Antilipid therapy:  Lipid lowering agents: [unfilled]      Antiplatelet therapy:  Agent: aspirin - 81 MG     Body mass index is 31.09 kg/m².    Lab Results   Component Value Date    CHOL 111 04/09/2024 " "   LDLCALC 64 04/09/2024    HDL 22 (L) 04/09/2024    TRIG 123 04/09/2024    HGBA1C 5.6 04/09/2024          Bone Health    No results found for: "[id number]", "[id number]"     No results found for this or any previous visit.         Staging Imaging     No results found for this or any previous visit.       No results found for this or any previous visit.      No results found for this or any previous visit.       No results found for this or any previous visit.       I have personally reviewed the above imaging.     Path:   Reviewed pathology as documented above.    Genomic testing:     Germline genetic testing  No results found for this or any previous visit.     Somatic tumor genotyping:      ctDNA genotyping:       Diagnoses:     1. Prostate cancer    2. Hypertension, unspecified type    3. Paroxysmal atrial fibrillation    4. Neurogenic bladder    5. Type 2 diabetes mellitus with other circulatory complication, with long-term current use of insulin    6. Anemia due to gastrointestinal blood loss          Assessment and Plan:     1. Prostate cancer  Overview:  de divya high volume metastatic prostate cancer in the setting of acute on chronic anemia and abdominal pain with associated malaise and weight loss. He has been managed primarily at [facility] and started degarelix 07/18/24.    Assessment & Plan:  Agree with upfront AR antagonist with ADT; plans to start enzalutamide tomorrow. Likely poor candidate for triplet therapy with docetaxel.     Would recommend getting PSMA PET CT rather than prior FDG PET. Also recommend sending molecular testing with somatic NGS (potentially from bone marrow biopsy) in addition to germline genetic testing.    He has had recent bone mineral density test. Has moderate risk osteopenia. Discussed role of Ca/D and may consider early use of antiresorptive therapy given recent femur fracture.    He is contemplating where he will receive his care. Will continue to follow at [facility] for now. He " will let us know if he would like to transfer care.       2. Hypertension, unspecified type  Overview:  Home medications include amlodipine, losartan, and metoprolol      3. Paroxysmal atrial fibrillation  Overview:  Home medications include ASA and metoprolol. Low volume burden.      4. Neurogenic bladder  Overview:  Has chronic suprapubic cathter in place; follows with urology      5. Type 2 diabetes mellitus with other circulatory complication, with long-term current use of insulin  Overview:  Home medications include Tresiba, jardiance, and novolog. Prior partial foot amputations      6. Anemia due to gastrointestinal blood loss  Assessment & Plan:  Ferritin currently elevated, likely multifactorial in setting of metastatic prostate cancer, chronic inflammation, and prior blood loss. Remaisn on po iron.              Follow up:   Route Chart for Scheduling    Med Onc Chart Routing      Follow up with physician No follow up needed.   Follow up with [facility]    Infusion scheduling note    Injection scheduling note    Labs    Imaging    Pharmacy appointment    Other referrals                           The above information has been reviewed with the patient and all questions have been answered to their apparent satisfaction.  They understand that they can call the clinic with any questions.    Johnny Harris MD MPH  Staff Physician     [facility]  [address] [place] [id number]  Email: [email or web address]  Phone: o) [phone] (c) [phone]

## 2024-07-24 ENCOUNTER — OFFICE VISIT (OUTPATIENT)
Dept: HEMATOLOGY/ONCOLOGY | Facility: CLINIC | Age: 69
End: 2024-07-24
Payer: MEDICARE

## 2024-07-24 VITALS
DIASTOLIC BLOOD PRESSURE: 58 MMHG | OXYGEN SATURATION: 99 % | BODY MASS INDEX: 31.09 KG/M2 | HEART RATE: 67 BPM | SYSTOLIC BLOOD PRESSURE: 123 MMHG | WEIGHT: 210.5 LBS | TEMPERATURE: 97 F

## 2024-07-24 DIAGNOSIS — I10 HYPERTENSION, UNSPECIFIED TYPE: ICD-10-CM

## 2024-07-24 DIAGNOSIS — N31.9 NEUROGENIC BLADDER: ICD-10-CM

## 2024-07-24 DIAGNOSIS — I48.0 PAROXYSMAL ATRIAL FIBRILLATION: ICD-10-CM

## 2024-07-24 DIAGNOSIS — D50.0 ANEMIA DUE TO GASTROINTESTINAL BLOOD LOSS: ICD-10-CM

## 2024-07-24 DIAGNOSIS — C61 PROSTATE CANCER: Primary | ICD-10-CM

## 2024-07-24 DIAGNOSIS — Z79.4 TYPE 2 DIABETES MELLITUS WITH OTHER CIRCULATORY COMPLICATION, WITH LONG-TERM CURRENT USE OF INSULIN: ICD-10-CM

## 2024-07-24 DIAGNOSIS — E11.59 TYPE 2 DIABETES MELLITUS WITH OTHER CIRCULATORY COMPLICATION, WITH LONG-TERM CURRENT USE OF INSULIN: ICD-10-CM

## 2024-07-24 PROCEDURE — 3044F HG A1C LEVEL LT 7.0%: CPT | Mod: CPTII,S$GLB,, | Performed by: HOSPITALIST

## 2024-07-24 PROCEDURE — 3078F DIAST BP <80 MM HG: CPT | Mod: CPTII,S$GLB,, | Performed by: HOSPITALIST

## 2024-07-24 PROCEDURE — 1126F AMNT PAIN NOTED NONE PRSNT: CPT | Mod: CPTII,S$GLB,, | Performed by: HOSPITALIST

## 2024-07-24 PROCEDURE — 1159F MED LIST DOCD IN RCRD: CPT | Mod: CPTII,S$GLB,, | Performed by: HOSPITALIST

## 2024-07-24 PROCEDURE — 4010F ACE/ARB THERAPY RXD/TAKEN: CPT | Mod: CPTII,S$GLB,, | Performed by: HOSPITALIST

## 2024-07-24 PROCEDURE — 3074F SYST BP LT 130 MM HG: CPT | Mod: CPTII,S$GLB,, | Performed by: HOSPITALIST

## 2024-07-24 PROCEDURE — 99205 OFFICE O/P NEW HI 60 MIN: CPT | Mod: S$GLB,,, | Performed by: HOSPITALIST

## 2024-07-24 PROCEDURE — 99999 PR PBB SHADOW E&M-EST. PATIENT-LVL V: CPT | Mod: PBBFAC,,, | Performed by: HOSPITALIST

## 2024-07-24 PROCEDURE — 1101F PT FALLS ASSESS-DOCD LE1/YR: CPT | Mod: CPTII,S$GLB,, | Performed by: HOSPITALIST

## 2024-07-24 PROCEDURE — 3008F BODY MASS INDEX DOCD: CPT | Mod: CPTII,S$GLB,, | Performed by: HOSPITALIST

## 2024-07-24 PROCEDURE — 3288F FALL RISK ASSESSMENT DOCD: CPT | Mod: CPTII,S$GLB,, | Performed by: HOSPITALIST

## 2024-07-24 NOTE — ASSESSMENT & PLAN NOTE
Agree with upfront AR antagonist with ADT; plans to start enzalutamide tomorrow. Likely poor candidate for triplet therapy with docetaxel.     Would recommend getting PSMA PET CT rather than prior FDG PET. Also recommend sending molecular testing with somatic NGS (potentially from bone marrow biopsy) in addition to germline genetic testing.    He has had recent bone mineral density test. Has moderate risk osteopenia. Discussed role of Ca/D and may consider early use of antiresorptive therapy given recent femur fracture.    He is contemplating where he will receive his care. Will continue to follow at [facility] for now. He will let us know if he would like to transfer care.

## 2024-07-24 NOTE — ASSESSMENT & PLAN NOTE
Ferritin currently elevated, likely multifactorial in setting of metastatic prostate cancer, chronic inflammation, and prior blood loss. Remaisn on po iron.

## 2024-07-24 NOTE — PATIENT INSTRUCTIONS
We discussed your recent diagnosis of a metastatic prostate cancer. By report the cancer has spread to several sites throughout the skeleton. You have started hormonal therapy through Dr. Claudio's office with androgen deprivation shots and planning to start enzalutamide treatment. I agree with this up front strategy. Some may consider use of docetaxel based chemotherapy, but I also agree with holding off for now.    We discussed typical side effects of hormone therapy including fatigue, weight gain, loss libido, forgetfulness, hot flashes, and muscle loss. Other longer term risks can include increased risk of cardiovascular disease and osteoporosis.    Recommend you continue on vitamin D 1000 IU daily to promote bone health.    Also recommend genetic testing to see if you inherited a prostate cancer gene, this information can be helpful in planning future treatments. Would also consdier sending DNA analysis of the cancer itself to identify potential targets of therapy.     You can elect to continue your treatment at [facility] or here, whichever makes you more comfortable. Would encourage you to follow up with Dr. Claudio's clinic for planned education tomorrow.    If you would like us to take over certain aspects of your care, let us know and we can arrange, otherwise would leave plans in place at [facility] for now.

## 2024-09-09 PROBLEM — D50.0 ANEMIA DUE TO GASTROINTESTINAL BLOOD LOSS: Status: RESOLVED | Noted: 2024-06-10 | Resolved: 2024-09-09

## 2024-10-07 ENCOUNTER — PATIENT MESSAGE (OUTPATIENT)
Dept: FAMILY MEDICINE | Facility: CLINIC | Age: 69
End: 2024-10-07

## 2024-10-07 ENCOUNTER — OFFICE VISIT (OUTPATIENT)
Dept: FAMILY MEDICINE | Facility: CLINIC | Age: 69
End: 2024-10-07
Payer: MEDICARE

## 2024-10-07 VITALS
BODY MASS INDEX: 34.5 KG/M2 | HEART RATE: 68 BPM | OXYGEN SATURATION: 98 % | DIASTOLIC BLOOD PRESSURE: 60 MMHG | SYSTOLIC BLOOD PRESSURE: 104 MMHG | WEIGHT: 232.94 LBS | TEMPERATURE: 98 F | HEIGHT: 69 IN

## 2024-10-07 DIAGNOSIS — R79.9 ABNORMAL FINDING OF BLOOD CHEMISTRY, UNSPECIFIED: ICD-10-CM

## 2024-10-07 DIAGNOSIS — Z79.4 TYPE 2 DIABETES MELLITUS WITH OTHER CIRCULATORY COMPLICATION, WITH LONG-TERM CURRENT USE OF INSULIN: Primary | ICD-10-CM

## 2024-10-07 DIAGNOSIS — E11.3313 TYPE 2 DIABETES MELLITUS WITH BOTH EYES AFFECTED BY MODERATE NONPROLIFERATIVE RETINOPATHY AND MACULAR EDEMA, WITH LONG-TERM CURRENT USE OF INSULIN: ICD-10-CM

## 2024-10-07 DIAGNOSIS — E11.59 TYPE 2 DIABETES MELLITUS WITH OTHER CIRCULATORY COMPLICATION, WITH LONG-TERM CURRENT USE OF INSULIN: Primary | ICD-10-CM

## 2024-10-07 DIAGNOSIS — Z79.4 TYPE 2 DIABETES MELLITUS WITH BOTH EYES AFFECTED BY MODERATE NONPROLIFERATIVE RETINOPATHY AND MACULAR EDEMA, WITH LONG-TERM CURRENT USE OF INSULIN: ICD-10-CM

## 2024-10-07 DIAGNOSIS — Z23 NEED FOR INFLUENZA VACCINATION: ICD-10-CM

## 2024-10-07 DIAGNOSIS — H40.1130 PRIMARY OPEN ANGLE GLAUCOMA OF BOTH EYES, UNSPECIFIED GLAUCOMA STAGE: ICD-10-CM

## 2024-10-07 DIAGNOSIS — Z00.00 ROUTINE ADULT HEALTH MAINTENANCE: ICD-10-CM

## 2024-10-07 DIAGNOSIS — I73.9 PERIPHERAL VASCULAR DISEASE: ICD-10-CM

## 2024-10-07 DIAGNOSIS — E11.610: ICD-10-CM

## 2024-10-07 DIAGNOSIS — C61 PROSTATE CANCER METASTATIC TO MULTIPLE SITES: ICD-10-CM

## 2024-10-07 DIAGNOSIS — I10 PRIMARY HYPERTENSION: ICD-10-CM

## 2024-10-07 PROCEDURE — 3074F SYST BP LT 130 MM HG: CPT | Mod: CPTII,S$GLB,, | Performed by: INTERNAL MEDICINE

## 2024-10-07 PROCEDURE — 99205 OFFICE O/P NEW HI 60 MIN: CPT | Mod: S$GLB,,, | Performed by: INTERNAL MEDICINE

## 2024-10-07 PROCEDURE — 3078F DIAST BP <80 MM HG: CPT | Mod: CPTII,S$GLB,, | Performed by: INTERNAL MEDICINE

## 2024-10-07 PROCEDURE — 3008F BODY MASS INDEX DOCD: CPT | Mod: CPTII,S$GLB,, | Performed by: INTERNAL MEDICINE

## 2024-10-07 PROCEDURE — 90653 IIV ADJUVANT VACCINE IM: CPT | Mod: S$GLB,,, | Performed by: INTERNAL MEDICINE

## 2024-10-07 PROCEDURE — 99999 PR PBB SHADOW E&M-EST. PATIENT-LVL III: CPT | Mod: PBBFAC,,, | Performed by: INTERNAL MEDICINE

## 2024-10-07 PROCEDURE — G2211 COMPLEX E/M VISIT ADD ON: HCPCS | Mod: S$GLB,,, | Performed by: INTERNAL MEDICINE

## 2024-10-07 PROCEDURE — 4010F ACE/ARB THERAPY RXD/TAKEN: CPT | Mod: CPTII,S$GLB,, | Performed by: INTERNAL MEDICINE

## 2024-10-07 PROCEDURE — 1126F AMNT PAIN NOTED NONE PRSNT: CPT | Mod: CPTII,S$GLB,, | Performed by: INTERNAL MEDICINE

## 2024-10-07 PROCEDURE — 3288F FALL RISK ASSESSMENT DOCD: CPT | Mod: CPTII,S$GLB,, | Performed by: INTERNAL MEDICINE

## 2024-10-07 PROCEDURE — G0008 ADMIN INFLUENZA VIRUS VAC: HCPCS | Mod: S$GLB,,, | Performed by: INTERNAL MEDICINE

## 2024-10-07 PROCEDURE — 1101F PT FALLS ASSESS-DOCD LE1/YR: CPT | Mod: CPTII,S$GLB,, | Performed by: INTERNAL MEDICINE

## 2024-10-07 PROCEDURE — 3044F HG A1C LEVEL LT 7.0%: CPT | Mod: CPTII,S$GLB,, | Performed by: INTERNAL MEDICINE

## 2024-10-07 RX ORDER — ERGOCALCIFEROL 1.25 MG/1
1 CAPSULE ORAL
COMMUNITY
Start: 2023-10-26 | End: 2024-10-25

## 2024-10-07 RX ORDER — METOPROLOL TARTRATE 50 MG/1
50 TABLET ORAL 2 TIMES DAILY
Start: 2024-10-07 | End: 2025-10-07

## 2024-10-07 RX ORDER — SODIUM BICARBONATE 650 MG/1
1 TABLET ORAL EVERY 8 HOURS
COMMUNITY
Start: 2024-08-12 | End: 2025-08-12

## 2024-10-07 RX ORDER — PREDNISONE 5 MG/1
1 TABLET ORAL 2 TIMES DAILY
COMMUNITY

## 2024-10-07 RX ORDER — AMLODIPINE BESYLATE 10 MG/1
10 TABLET ORAL DAILY
Qty: 90 TABLET | Refills: 3 | Status: SHIPPED | OUTPATIENT
Start: 2024-10-07

## 2024-10-07 RX ORDER — ENZALUTAMIDE 40 MG/1
2 TABLET ORAL 2 TIMES DAILY
COMMUNITY
Start: 2024-09-10

## 2024-10-07 RX ORDER — LOSARTAN POTASSIUM 25 MG/1
25 TABLET ORAL DAILY
Qty: 9 TABLET | Refills: 3 | Status: SHIPPED | OUTPATIENT
Start: 2024-10-07

## 2024-10-07 NOTE — PROGRESS NOTES
Subjective:     Chief Complaint   Patient presents with    Establish Care       HPI  Rodrick Pearl is a 69 y.o. male with medical diagnoses as listed in the medical history and problem list that presents for above complaint(s).    Establishing Care with [facility]    Metastatic Prostate Cancer (diagnosed in June 2024)  -Followed by Dr. Joe Claudio at [facility] Heme/Onc  -Triple Therapy/Chemo, pain well controlled, no N/V  -Associated anemia    T2DM  -Insulin: 40U nightly, 8-16 U before meals  -Jardiance  -Neuropathy: Loss of feeling in hands and feet  -Eyes: Retinopathy; has regular retina specialist  -Foot: No sensation in either feet    HTN  -Amlodipine 10mg, Cozaar, Lopressor  -Home BP: 130-140/60s    HLD  -Lipitor 10mg    Social  -Retired  -Difficulty walking, will return to PT  -Never smoker  -Social drinker in past    Screening  -Planning to get flu and COVID booster  -Shingles done at [facility] 2-3 years ago  -Colonoscopy 2 years ago    Patient Care Team:  Edgar Hager MD as PCP - General (Internal Medicine)  Sasha Mccoy, RN as Oncology Navigator (Surgery)  Johnny Harris MD as Consulting Physician (Hematology and Oncology)      PAST MEDICAL HISTORY:  Past Medical History:   Diagnosis Date    Diabetes mellitus     Hypertension        PAST SURGICAL HISTORY:  Past Surgical History:   Procedure Laterality Date    ENDOSCOPY OF PROXIMAL SMALL INTESTINE N/A 6/3/2024    Procedure: ENTEROSCOPY, PROXIMAL;  Surgeon: Kj Zee MD;  Location: [facility];  Service: Endoscopy;  Laterality: N/A;  upper SBE    FEMUR FRACTURE SURGERY Right     TOE AMPUTATION Left     Great toe    TOE AMPUTATION Right     little toe    TONSILLECTOMY      TOTAL KNEE ARTHROPLASTY Right        SOCIAL HISTORY:  Social History     Socioeconomic History    Marital status: Unknown   Tobacco Use    Smoking status: Never    Smokeless tobacco: Never   Substance and Sexual Activity    Alcohol use: Not Currently    Drug use: Never    Social History Narrative    Lives in [place] with his wife, Naomi. Also with son Kaz and daughter Althea. Previously worked as .        FAMILY HISTORY:  Family History   Problem Relation Name Age of Onset    Lung cancer Father      Kidney cancer Sister      Breast cancer Sister radha     Prostate cancer Paternal Uncle      Lung cancer Paternal Uncle      Pancreatic cancer Paternal Aunt         ALLERGIES AND MEDICATIONS: updated and reviewed.  Review of patient's allergies indicates:   Allergen Reactions    Rhopressa [netarsudil] Other (See Comments)     Current Outpatient Medications   Medication Sig Dispense Refill    ergocalciferol (ERGOCALCIFEROL) 50,000 unit Cap Take 1 capsule by mouth every 7 days.      sodium bicarbonate 650 MG tablet Take 1 tablet by mouth every 8 (eight) hours.      XTANDI 40 mg Tab Take 2 tablets by mouth 2 (two) times daily.      amLODIPine (NORVASC) 10 MG tablet Take 1 tablet (10 mg total) by mouth once daily. 90 tablet 3    aspirin (ECOTRIN) 81 MG EC tablet Take 81 mg by mouth once daily.      atorvastatin (LIPITOR) 10 MG tablet Take 10 mg by mouth once daily.      brimonidine 0.1% (ALPHAGAN P) 0.1 % Drop Place 1 drop into both eyes 3 (three) times daily.      dorzolamide (TRUSOPT) 2 % ophthalmic solution 1 drop 3 (three) times daily.      doxazosin (CARDURA) 2 MG tablet Take 2 mg by mouth every evening.      empagliflozin (JARDIANCE) 25 mg tablet Take 25 mg by mouth once daily.      ferrous sulfate (FEOSOL) Tab tablet Take 1 tablet by mouth daily with breakfast.      insulin aspart U-100 (NOVOLOG) 100 unit/mL injection Inject into the skin 3 (three) times daily before meals.      insulin degludec (TRESIBA FLEXTOUCH U-200 SUBQ) Inject into the skin.      losartan (COZAAR) 25 MG tablet Take 1 tablet (25 mg total) by mouth once daily. 9 tablet 3    metoprolol tartrate (LOPRESSOR) 50 MG tablet Take 1 tablet (50 mg total) by mouth 2 (two) times daily.       "pantoprazole (PROTONIX) 40 MG tablet Take 40 mg by mouth once daily.      predniSONE (DELTASONE) 5 MG tablet Take 1 tablet by mouth 2 (two) times daily.       No current facility-administered medications for this visit.         Objective:       Physical Exam  Vitals:    10/07/24 1029   BP: 104/60   Pulse: 68   Temp: 97.9 °F (36.6 °C)   TempSrc: Oral   SpO2: 98%   Weight: 105.7 kg (232 lb 14.7 oz)   Height: 5' 9" (1.753 m)    Body mass index is 34.4 kg/m².  Weight: 105.7 kg (232 lb 14.7 oz)   Height: 5' 9" (175.3 cm)   Physical Exam  Constitutional:       Appearance: Normal appearance. He is obese. He is not ill-appearing.   HENT:      Head: Normocephalic and atraumatic.      Right Ear: Tympanic membrane and ear canal normal.      Left Ear: Tympanic membrane and ear canal normal.      Nose: Nose normal.      Mouth/Throat:      Mouth: Mucous membranes are moist.   Eyes:      Extraocular Movements: Extraocular movements intact.      Pupils: Pupils are equal, round, and reactive to light.   Cardiovascular:      Rate and Rhythm: Normal rate and regular rhythm.      Pulses: Normal pulses.      Heart sounds: Normal heart sounds. No murmur heard.  Pulmonary:      Effort: Pulmonary effort is normal.      Breath sounds: Normal breath sounds.   Genitourinary:     Comments: Suprapubic Catheter in place  Musculoskeletal:         General: Swelling (B/l pitting edema to mid-calf) present.      Cervical back: Normal range of motion and neck supple. No tenderness.      Right knee: Decreased range of motion.      Left knee: Normal.      Right foot: Deformity and Charcot foot present.      Left foot: Deformity and Charcot foot present.      Comments: Large surgical scar on right knee and thigh   Feet:      Comments: Hx of toe amputations and b/l neuropathy  Skin:     General: Skin is warm and dry.      Capillary Refill: Capillary refill takes 2 to 3 seconds.   Neurological:      Mental Status: He is alert and oriented to person, " place, and time.      Sensory: Sensory deficit (B/l loss of sensation in feet) present.      Comments: In mobility scooter   Psychiatric:         Mood and Affect: Mood normal.         Behavior: Behavior normal.             Assessment:     1. Type 2 diabetes mellitus with other circulatory complication, with long-term current use of insulin    2. Charcot joint of foot due to diabetes    3. Primary hypertension    4. Prostate cancer metastatic to multiple sites    5. Routine adult health maintenance    6. Primary open angle glaucoma of both eyes, unspecified glaucoma stage    7. Abnormal finding of blood chemistry, unspecified    8. Need for influenza vaccination    9. Type 2 diabetes mellitus with both eyes affected by moderate nonproliferative retinopathy and macular edema, with long-term current use of insulin    10. Peripheral vascular disease      Plan:     Rodrick was seen today for establish care. Patient is medically complex and high risk of medical complications and/or morbidity secondary to one or several of the problems listed below:    Diagnoses and all orders for this visit:    Type 2 diabetes mellitus with other circulatory complication, with long-term current use of insulin  Charcot joint of foot due to diabetes  Type 2 diabetes mellitus with both eyes affected by moderate nonproliferative retinopathy and macular edema, with long-term current use of insulin  Reviewed   The current medical regimen is effective;  continue present plan and medications.  -     HEMOGLOBIN A1C; Future  -     Foot Exam Performed    Primary hypertension  BP controlled presently - reviewed anti-hypertensive regimen - continue current therapy  -     metoprolol tartrate (LOPRESSOR) 50 MG tablet; Take 1 tablet (50 mg total) by mouth 2 (two) times daily.  -     amLODIPine (NORVASC) 10 MG tablet; Take 1 tablet (10 mg total) by mouth once daily.  -     losartan (COZAAR) 25 MG tablet; Take 1 tablet (25 mg total) by mouth once  daily.    Prostate cancer metastatic to multiple sites  Discussed currently cancer pharmacotherapy per [facility], second opinion with [facility] performed recently  Continue Oncology follow-up    Routine adult health maintenance  Discussed healthy diet, regular exercise, necessary labs, age appropriate cancer screening, and routine vaccinations.    -     HEMOGLOBIN A1C; Future  -     LIPID PANEL; Future    Primary open angle glaucoma of both eyes, unspecified glaucoma stage  Following with Ophtho    Need for influenza vaccination  Counseled regarding seasonal influenza vaccination - administered  -     influenza (adjuvanted) (Fluad) 45 mcg/0.5 mL IM vaccine (> or = 64 yo) 0.5 mL      Peripheral vascular disease  The current medical regimen is effective;  continue present plan and medications.      Health Maintenance reviewed, addressed as per orders    I spent a total of 60 minutes on the day of the visit.This includes face to face time and non-face to face time preparing to see the patient (eg, review of tests), obtaining and/or reviewing separately obtained history, documenting clinical information in the electronic or other health record, independently interpreting results and communicating results to the patient/family/caregiver, or care coordinator.    Visit today included increased complexity associated with the care of the episodic problems addressed and managing the longitudinal care of the patient due to the serious and/or complex managed problem(s) as per assessment/plan.       F/u in 4 months      1. The patient indicates understanding of these issues and agrees with the plan. Brief care plan is updated and reviewed with the patient as applicable.   2. The patient is given an After Visit Summary that lists all medications with directions, allergies, orders placed during this encounter and follow-up instructions.   3. I have reviewed the patient's medical information including past medical,  family, and social history sections including the medications and allergies.   4. We discussed the patient's current medications. I reconciled the patient's medication list and prepared and supplied needed refills.       Edgar Hager MD  Internal Medicine-Pediatrics

## 2024-10-09 PROBLEM — Z79.4 TYPE 2 DIABETES MELLITUS WITH BOTH EYES AFFECTED BY MODERATE NONPROLIFERATIVE RETINOPATHY AND MACULAR EDEMA, WITH LONG-TERM CURRENT USE OF INSULIN: Status: ACTIVE | Noted: 2024-10-09

## 2024-10-09 PROBLEM — I73.9 PERIPHERAL VASCULAR DISEASE: Status: ACTIVE | Noted: 2024-10-09

## 2024-10-09 PROBLEM — E11.3313 TYPE 2 DIABETES MELLITUS WITH BOTH EYES AFFECTED BY MODERATE NONPROLIFERATIVE RETINOPATHY AND MACULAR EDEMA, WITH LONG-TERM CURRENT USE OF INSULIN: Status: ACTIVE | Noted: 2024-10-09

## 2024-12-04 ENCOUNTER — TELEPHONE (OUTPATIENT)
Dept: FAMILY MEDICINE | Facility: CLINIC | Age: 69
End: 2024-12-04
Payer: MEDICARE

## 2024-12-04 DIAGNOSIS — E11.59 TYPE 2 DIABETES MELLITUS WITH OTHER CIRCULATORY COMPLICATION, WITH LONG-TERM CURRENT USE OF INSULIN: Primary | ICD-10-CM

## 2024-12-04 DIAGNOSIS — Z79.4 TYPE 2 DIABETES MELLITUS WITH OTHER CIRCULATORY COMPLICATION, WITH LONG-TERM CURRENT USE OF INSULIN: Primary | ICD-10-CM

## 2024-12-04 RX ORDER — FLASH GLUCOSE SENSOR
1 KIT MISCELLANEOUS DAILY
Qty: 1 KIT | Refills: 5 | Status: SHIPPED | OUTPATIENT
Start: 2024-12-04

## 2024-12-04 NOTE — TELEPHONE ENCOUNTER
Call returned to patient and snesors clarified for Freestyle Agnes 2 to be sent to [facility] for home delivery. Understanding and thank you verbalized. Pended for MD.

## 2024-12-04 NOTE — TELEPHONE ENCOUNTER
----- Message from Ale sent at 12/4/2024  9:40 AM CST -----  Type: Patient Call Back    Who called: wife    What is the request in detail: pt needs orders for sensor to be sent to [facility]    Can the clinic reply by [facility]? No     Would the patient rather a call back or a response via [facility]?  call    Best call back number: .[phone] (home)      Additional Information:

## 2024-12-04 NOTE — TELEPHONE ENCOUNTER
Call was placed to patient and notified of MD sending request for freestyle josue 2 sensors to [facility] per MD. Thank you verbalized.

## 2024-12-12 ENCOUNTER — TELEPHONE (OUTPATIENT)
Dept: FAMILY MEDICINE | Facility: CLINIC | Age: 69
End: 2024-12-12
Payer: MEDICARE

## 2024-12-12 NOTE — TELEPHONE ENCOUNTER
----- Message from Denisse sent at 12/12/2024  9:37 AM CST -----  Regarding: Pharmacy Call Back  Type:  Pharmacy Calling to Clarify an RX    Name of Caller: Erika     Pharmacy Name: [facility]     Prescription Name: freestyle josue Dickinson     What do they need to clarify? Needs diagnostic code added     Can you be contacted via [facility]? No     Best Call Back Number: [phone]

## 2024-12-17 ENCOUNTER — TELEPHONE (OUTPATIENT)
Dept: FAMILY MEDICINE | Facility: CLINIC | Age: 69
End: 2024-12-17
Payer: MEDICARE

## 2024-12-17 NOTE — TELEPHONE ENCOUNTER
----- Message from Med Assistant Kennedy sent at 12/17/2024  9:14 AM CST -----  Who called:  Trevor with [facility]   What is the request in detail:  Status on pt CGM order   Can the clinic reply by [facility]? No    Would the patient rather a call back or a response via [facility]? Call back  Callback   Best call back number:  [phone]  Additional Information:    Thank you.

## 2024-12-17 NOTE — TELEPHONE ENCOUNTER
Returned call to [facility] to inform them of new prescription/request sent via e-fax to them per MD in regard to the freestyle josue. Representative verbalized non-receipt and requested that it be re-faxed along with patients last PCP visit notes. This nurse verbalized understanding and thank you. Paperwork faxed (non e-fax) to [phone] this day.

## 2025-03-06 ENCOUNTER — PATIENT OUTREACH (OUTPATIENT)
Dept: ADMINISTRATIVE | Facility: HOSPITAL | Age: 70
End: 2025-03-06
Payer: MEDICARE

## 2025-03-06 NOTE — PROGRESS NOTES
External lab report received, uploaded to chart and  hyper-linked in .  [facility] evaluation upload to [facility]

## 2025-03-21 ENCOUNTER — LAB VISIT (OUTPATIENT)
Dept: LAB | Facility: HOSPITAL | Age: 70
End: 2025-03-21
Attending: INTERNAL MEDICINE
Payer: MEDICARE

## 2025-03-21 ENCOUNTER — PATIENT MESSAGE (OUTPATIENT)
Dept: ADMINISTRATIVE | Facility: HOSPITAL | Age: 70
End: 2025-03-21
Payer: MEDICARE

## 2025-03-21 ENCOUNTER — OFFICE VISIT (OUTPATIENT)
Dept: FAMILY MEDICINE | Facility: CLINIC | Age: 70
End: 2025-03-21
Payer: MEDICARE

## 2025-03-21 VITALS
HEIGHT: 69 IN | SYSTOLIC BLOOD PRESSURE: 116 MMHG | OXYGEN SATURATION: 97 % | DIASTOLIC BLOOD PRESSURE: 64 MMHG | BODY MASS INDEX: 34.4 KG/M2 | TEMPERATURE: 98 F | HEART RATE: 66 BPM

## 2025-03-21 DIAGNOSIS — E11.59 TYPE 2 DIABETES MELLITUS WITH OTHER CIRCULATORY COMPLICATION, WITH LONG-TERM CURRENT USE OF INSULIN: ICD-10-CM

## 2025-03-21 DIAGNOSIS — E78.5 DYSLIPIDEMIA: ICD-10-CM

## 2025-03-21 DIAGNOSIS — Z00.00 ROUTINE ADULT HEALTH MAINTENANCE: Primary | ICD-10-CM

## 2025-03-21 DIAGNOSIS — C79.51 PROSTATE CANCER METASTATIC TO BONE: ICD-10-CM

## 2025-03-21 DIAGNOSIS — E11.3313 TYPE 2 DIABETES MELLITUS WITH BOTH EYES AFFECTED BY MODERATE NONPROLIFERATIVE RETINOPATHY AND MACULAR EDEMA, WITH LONG-TERM CURRENT USE OF INSULIN: ICD-10-CM

## 2025-03-21 DIAGNOSIS — Z93.59 SUPRAPUBIC CATHETER: ICD-10-CM

## 2025-03-21 DIAGNOSIS — K21.9 GASTROESOPHAGEAL REFLUX DISEASE, UNSPECIFIED WHETHER ESOPHAGITIS PRESENT: ICD-10-CM

## 2025-03-21 DIAGNOSIS — I48.0 PAROXYSMAL ATRIAL FIBRILLATION: ICD-10-CM

## 2025-03-21 DIAGNOSIS — C61 PROSTATE CANCER: ICD-10-CM

## 2025-03-21 DIAGNOSIS — Z00.00 ROUTINE ADULT HEALTH MAINTENANCE: ICD-10-CM

## 2025-03-21 DIAGNOSIS — Z79.4 TYPE 2 DIABETES MELLITUS WITH OTHER CIRCULATORY COMPLICATION, WITH LONG-TERM CURRENT USE OF INSULIN: ICD-10-CM

## 2025-03-21 DIAGNOSIS — I12.0 HYPERTENSIVE CHRONIC KIDNEY DISEASE WITH STAGE 5 CHRONIC KIDNEY DISEASE OR END STAGE RENAL DISEASE: ICD-10-CM

## 2025-03-21 DIAGNOSIS — Z23 NEED FOR PNEUMOCOCCAL VACCINATION: ICD-10-CM

## 2025-03-21 DIAGNOSIS — Z12.11 SCREENING FOR COLON CANCER: ICD-10-CM

## 2025-03-21 DIAGNOSIS — Z79.4 TYPE 2 DIABETES MELLITUS WITH BOTH EYES AFFECTED BY MODERATE NONPROLIFERATIVE RETINOPATHY AND MACULAR EDEMA, WITH LONG-TERM CURRENT USE OF INSULIN: ICD-10-CM

## 2025-03-21 DIAGNOSIS — I10 PRIMARY HYPERTENSION: ICD-10-CM

## 2025-03-21 DIAGNOSIS — C61 PROSTATE CANCER METASTATIC TO BONE: ICD-10-CM

## 2025-03-21 LAB
ALBUMIN SERPL BCP-MCNC: 2.9 G/DL (ref 3.5–5.2)
ALP SERPL-CCNC: 184 U/L (ref 40–150)
ALT SERPL W/O P-5'-P-CCNC: 17 U/L (ref 10–44)
ANION GAP SERPL CALC-SCNC: 9 MMOL/L (ref 8–16)
AST SERPL-CCNC: 18 U/L (ref 10–40)
BASOPHILS # BLD AUTO: 0.05 K/UL (ref 0–0.2)
BASOPHILS NFR BLD: 0.7 % (ref 0–1.9)
BILIRUB SERPL-MCNC: 0.2 MG/DL (ref 0.1–1)
BUN SERPL-MCNC: 46 MG/DL (ref 8–23)
CALCIUM SERPL-MCNC: 8.3 MG/DL (ref 8.7–10.5)
CHLORIDE SERPL-SCNC: 106 MMOL/L (ref 95–110)
CHOLEST SERPL-MCNC: 140 MG/DL (ref 120–199)
CHOLEST/HDLC SERPL: 4.4 {RATIO} (ref 2–5)
CO2 SERPL-SCNC: 26 MMOL/L (ref 23–29)
CREAT SERPL-MCNC: 1.4 MG/DL (ref 0.5–1.4)
DIFFERENTIAL METHOD BLD: ABNORMAL
EOSINOPHIL # BLD AUTO: 0.4 K/UL (ref 0–0.5)
EOSINOPHIL NFR BLD: 5.6 % (ref 0–8)
ERYTHROCYTE [DISTWIDTH] IN BLOOD BY AUTOMATED COUNT: 13.4 % (ref 11.5–14.5)
EST. GFR  (NO RACE VARIABLE): 54.7 ML/MIN/1.73 M^2
ESTIMATED AVG GLUCOSE: 146 MG/DL (ref 68–131)
ESTIMATED AVG GLUCOSE: 148 MG/DL (ref 68–131)
GLUCOSE SERPL-MCNC: 94 MG/DL (ref 70–110)
HBA1C MFR BLD: 6.7 % (ref 4–5.6)
HBA1C MFR BLD: 6.8 % (ref 4–5.6)
HCT VFR BLD AUTO: 30.8 % (ref 40–54)
HDLC SERPL-MCNC: 32 MG/DL (ref 40–75)
HDLC SERPL: 22.9 % (ref 20–50)
HGB BLD-MCNC: 9.5 G/DL (ref 14–18)
IMM GRANULOCYTES # BLD AUTO: 0.07 K/UL (ref 0–0.04)
IMM GRANULOCYTES NFR BLD AUTO: 0.9 % (ref 0–0.5)
LDLC SERPL CALC-MCNC: 82 MG/DL (ref 63–159)
LYMPHOCYTES # BLD AUTO: 2.1 K/UL (ref 1–4.8)
LYMPHOCYTES NFR BLD: 27.4 % (ref 18–48)
MCH RBC QN AUTO: 32 PG (ref 27–31)
MCHC RBC AUTO-ENTMCNC: 30.8 G/DL (ref 32–36)
MCV RBC AUTO: 104 FL (ref 82–98)
MONOCYTES # BLD AUTO: 0.7 K/UL (ref 0.3–1)
MONOCYTES NFR BLD: 8.9 % (ref 4–15)
NEUTROPHILS # BLD AUTO: 4.3 K/UL (ref 1.8–7.7)
NEUTROPHILS NFR BLD: 56.5 % (ref 38–73)
NONHDLC SERPL-MCNC: 108 MG/DL
NRBC BLD-RTO: 0 /100 WBC
PLATELET # BLD AUTO: 190 K/UL (ref 150–450)
PMV BLD AUTO: 9.6 FL (ref 9.2–12.9)
POTASSIUM SERPL-SCNC: 4.7 MMOL/L (ref 3.5–5.1)
PROT SERPL-MCNC: 6.9 G/DL (ref 6–8.4)
RBC # BLD AUTO: 2.97 M/UL (ref 4.6–6.2)
SODIUM SERPL-SCNC: 141 MMOL/L (ref 136–145)
TRIGL SERPL-MCNC: 130 MG/DL (ref 30–150)
WBC # BLD AUTO: 7.66 K/UL (ref 3.9–12.7)

## 2025-03-21 PROCEDURE — G0009 ADMIN PNEUMOCOCCAL VACCINE: HCPCS | Mod: S$GLB,,, | Performed by: INTERNAL MEDICINE

## 2025-03-21 PROCEDURE — 99999 PR PBB SHADOW E&M-EST. PATIENT-LVL III: CPT | Mod: PBBFAC,,, | Performed by: INTERNAL MEDICINE

## 2025-03-21 PROCEDURE — 3288F FALL RISK ASSESSMENT DOCD: CPT | Mod: CPTII,S$GLB,, | Performed by: INTERNAL MEDICINE

## 2025-03-21 PROCEDURE — 1101F PT FALLS ASSESS-DOCD LE1/YR: CPT | Mod: CPTII,S$GLB,, | Performed by: INTERNAL MEDICINE

## 2025-03-21 PROCEDURE — 85025 COMPLETE CBC W/AUTO DIFF WBC: CPT | Performed by: INTERNAL MEDICINE

## 2025-03-21 PROCEDURE — 99215 OFFICE O/P EST HI 40 MIN: CPT | Mod: 25,S$GLB,, | Performed by: INTERNAL MEDICINE

## 2025-03-21 PROCEDURE — 90677 PCV20 VACCINE IM: CPT | Mod: S$GLB,,, | Performed by: INTERNAL MEDICINE

## 2025-03-21 PROCEDURE — 80053 COMPREHEN METABOLIC PANEL: CPT | Performed by: INTERNAL MEDICINE

## 2025-03-21 PROCEDURE — 36415 COLL VENOUS BLD VENIPUNCTURE: CPT | Mod: PO | Performed by: INTERNAL MEDICINE

## 2025-03-21 PROCEDURE — 3078F DIAST BP <80 MM HG: CPT | Mod: CPTII,S$GLB,, | Performed by: INTERNAL MEDICINE

## 2025-03-21 PROCEDURE — 99397 PER PM REEVAL EST PAT 65+ YR: CPT | Mod: S$GLB,,, | Performed by: INTERNAL MEDICINE

## 2025-03-21 PROCEDURE — 83036 HEMOGLOBIN GLYCOSYLATED A1C: CPT | Mod: 91 | Performed by: INTERNAL MEDICINE

## 2025-03-21 PROCEDURE — 3044F HG A1C LEVEL LT 7.0%: CPT | Mod: CPTII,S$GLB,, | Performed by: INTERNAL MEDICINE

## 2025-03-21 PROCEDURE — 3008F BODY MASS INDEX DOCD: CPT | Mod: CPTII,S$GLB,, | Performed by: INTERNAL MEDICINE

## 2025-03-21 PROCEDURE — 1126F AMNT PAIN NOTED NONE PRSNT: CPT | Mod: CPTII,S$GLB,, | Performed by: INTERNAL MEDICINE

## 2025-03-21 PROCEDURE — 1159F MED LIST DOCD IN RCRD: CPT | Mod: CPTII,S$GLB,, | Performed by: INTERNAL MEDICINE

## 2025-03-21 PROCEDURE — 80061 LIPID PANEL: CPT | Performed by: INTERNAL MEDICINE

## 2025-03-21 PROCEDURE — 4010F ACE/ARB THERAPY RXD/TAKEN: CPT | Mod: CPTII,S$GLB,, | Performed by: INTERNAL MEDICINE

## 2025-03-21 PROCEDURE — 3074F SYST BP LT 130 MM HG: CPT | Mod: CPTII,S$GLB,, | Performed by: INTERNAL MEDICINE

## 2025-03-21 NOTE — PROGRESS NOTES
Patient tolerated Pneumococcal  Prevnar 20  injection. Advised to wait 15mins. VIS information received.

## 2025-03-22 ENCOUNTER — RESULTS FOLLOW-UP (OUTPATIENT)
Dept: FAMILY MEDICINE | Facility: CLINIC | Age: 70
End: 2025-03-22
Payer: MEDICARE

## 2025-03-22 NOTE — PROGRESS NOTES
Subjective:     History of Present Illness    CHIEF COMPLAINT:  - Rodrick presents for a follow-up visit to discuss his ongoing medical conditions, including prostate cancer treatment and general health status.    HPI:  Rodrick recently completed prostate cancer treatment with Dr. Haro, his oncologist, and had a follow-up visit approximately 2 months ago. He reports feeling fine overall, with a mild viral illness about 2 weeks ago that lasted 1-2 days and resolved without antibiotics or antivirals.    He has a history of anemia related to his chemotherapy treatment. His last hemoglobin level was low at 9 in January, but he did not require blood transfusions. His PSA level was 2.8, which is considered low and indicative of inactive prostate disease.    He has a suprapubic catheter installed in October 2022. The catheter is currently draining clear yellow urine without signs of blood or cloudiness. Dr. Reardon, his urologist, manages the catheter and believes it may be needed indefinitely due to urinary obstruction.    He has a history of atrial fibrillation (AFib), which occurs mainly when he has breathing problems, such as during past bouts of pneumonia and COVID-19. Outside of these respiratory issues, AFib is not an ongoing problem.    He is scheduled for a colonoscopy in June for colorectal cancer screening, though the exact date has not been set.    Regarding diabetes management, he reports no significant changes. He continues to take Jardiance and Tarceva for diabetes control. His blood pressure is well-controlled at 116/64, with no reports of dizziness or lightheadedness.    Dr. Haro discontinued Losartan 25mg due to concerns about increased potassium levels. He is no longer able to take Xtandi, and efforts are being made to secure a jose or find an alternative medication.    For mobility, he uses a scooter both inside and outside the home. He requires assistance from his son for transfers, particularly in the  bathroom where they have installed support bars around the toilet and shower. He denies any recent falls.    He reports mild edema in his legs that improves when elevated. He previously wore compression socks but has switched to regular socks recently.    He denies dark or bloody urine or urinary tract infections.    MEDICATIONS:  - Jardiance, for diabetes  - Tarceva, for diabetes  - Amlodipine 10 mg, daily, for blood pressure  - Metoprolol 50 mg, twice daily, for blood pressure  - Cardura, for prostate enlargement  - Lipitor, for cholesterol  - Aspirin  - Pantoprazole (Protonix), daily, for GERD    MEDICAL HISTORY:  - Prostate cancer: Treated with triple therapy chemo  - Anemia: Caused by chemotherapy  - Moderate kidney disease  - Atrial fibrillation (AFib)  - Diabetes  - Hypertension  - GERD (Gastroesophageal reflux disease)  - COVID-19 vaccine received in November    FAMILY HISTORY:  - Younger sister: Passed away in t  he past year due to complications from diabetes, kidney disease, and heart attacks. Had one kidney removed for renal cancer.  - Mother: Passed away at 96 years old    SURGICAL HISTORY:  - Suprapubic catheter placement: October 2022, for urinary obstruction    TEST RESULTS:  - Hemoglobin: January, 9 g/dL (low), indicating anemia  - Testosterone: January, 44 ng/dL, appropriately suppressed  - PSA: January, 2.8 ng/mL, low  - Kidney function: stable, indicating moderate kidney disease  - Heart function test: January, adequate pump function, not in heart failure    ROS:  Cardiovascular: +lower extremity edema  Respiratory: +shortness of breath  Gastrointestinal: +heartburn  Neurological: -dizziness         Objective:     Vitals:    03/21/25 0853   BP: 116/64   Pulse: 66   Temp: 97.7 °F (36.5 °C)       Physical Exam    HENT: Nares patent. Moist oral mucosa. All normal.  Ears: Bilateral TMs clear. Bilateral EACs clear.  Extremities: Trace edema in legs.  Mouth: Teeth and gums appear normal.  Neck: No  thyroid enlargement. No lymphadenopathy.         Assessment/Plan     1. Routine adult health maintenance        2. Need for pneumococcal vaccination  pneumoc 20-tess conj-dip cr(PF) (PREVNAR-20 (PF)) injection Syrg 0.5 mL      3. Screening for colon cancer        4. Primary hypertension  Comprehensive Metabolic Panel      5. Type 2 diabetes mellitus with other circulatory complication, with long-term current use of insulin  Hemoglobin A1C      6. Gastroesophageal reflux disease, unspecified whether esophagitis present        7. Paroxysmal atrial fibrillation        8. Prostate cancer  CBC Auto Differential      9. Dyslipidemia  Lipid Panel      10. Hypertensive chronic kidney disease with stage 5 chronic kidney disease or end stage renal disease        11. Suprapubic catheter        12. Type 2 diabetes mellitus with both eyes affected by moderate nonproliferative retinopathy and macular edema, with long-term current use of insulin        13. Prostate cancer metastatic to bone            Assessment & Plan      Rodrick Pearl was seen today for routine physical. Separate E/M Code for acute conditions discussed during today's routine physical examination have been documented in the assessment and plan below. Patient is medically complex and high risk of medical complications and/or morbidity secondary to one or several of the problems listed below:     Reviewed prostate cancer treatment progress with Dr. Haro, noting PSA of 2.8 and appropriately suppressed testosterone level of 44.   Renal function stable with moderate CKD.   Cardiac status stable based on Dr. Henley's recent assessment, noting stable heart function and intermittent AFib associated with respiratory issues.   Blood pressure well-managed on current regimen.   Unable to take Xtandi due to cost constraints, awaiting potential jose assistance.    ROUTINE ADULT HEALTH MAINTENANCE  Discussed healthy diet, regular exercise, necessary labs, age appropriate  cancer screening, and routine vaccinations.      MALIGNANT NEOPLASM OF PROSTATE:  Reviewed recent lab results and specialist's notes.  Last follow-up was 3 months ago in April, with stable PSA (2.8) and testosterone (44) levels.  Continued Cardura for prostate enlargement symptoms and androgen deprivation therapy for cancer management.  Rodrick was on Xtandi (enzalutamide) but is currently unable to afford it ($48,000/month).  Oncology efforts to secure a jose or find an alternative.  low hemoglobin of 9 in January.  Instructed patient to follow up with Dr. Haro for ongoing management.    TYPE 2 DIABETES MELLITUS WITH OTHER CIRCULATORY COMPLICATIONS:  Continued Jardiance and Tarceva for diabetes management.  Assessed diabetes as well-controlled on current medications.  Ordered A1C test due to lack of recent data.  Planned to review medication adjustments after receiving A1C results.    OTHER PANCYTOPENIA:  Ordered complete blood count due to lack of recent data.  Noted previous anemia due to chemotherapy, with a low hemoglobin of 9 in January.  Suspected improvement in hemoglobin levels since then.    PAROXYSMAL ATRIAL FIBRILLATION:  Noted intermittent AFib, usually associated with breathing problems or when SpO2 drops below 80.  Reviewed Dr. Henley's January report indicating fairly stable heart function with adequate pump function and no heart failure.  Continued Metoprolol 50 mg twice daily for AFib management.    HYPERTENSIVE CHRONIC KIDNEY DISEASE WITH STAGE 5 CHRONIC KIDNEY DISEASE OR END STAGE RENAL DISEASE:  Assessed kidney disease as moderate and stable.  Noted no issues with urination reported.  Monitored suprapubic catheter function.    SUPRAPUBIC CATHETER:  Catheter has been in place since October 2022 (almost 3 years).  Assessed function as good, clearing yellow urine without issues such as darkness, blood, or muddiness.  Noted Dr. Reardon's assessment that the catheter will likely be kept indefinitely  due to urinary obstruction issues.    ANEMIA DUE TO CHEMOTHERAPY:  Plan to check labs in April to reassess anemia.  No transfusions were required for management.    OBSTRUCTIVE UROPATHY:  Suprapubic catheter installed in October 2022 for urinary obstruction.  Catheter necessary to prevent kidney failure due to obstruction.  Follow up with urologist Dr. Reardon for ongoing management.    HYPERTENSION:  Discontinued Losartan 25 mg daily due to increased potassium levels.  Continue Amlodipine 10 mg daily.  Blood pressure well-controlled at 116/64 without over-correction.    GASTROESOPHAGEAL REFLUX DISEASE (GERD):  Continue Pantoprazole (Protonix) daily for GERD management.    CHRONIC KIDNEY DISEASE:  Order electrolytes panel due to lack of recent data.  Follow up with nephrologist Dr. Garcias for ongoing management.    DEPENDENCE ON ENABLING DEVICES:  Rodrick uses a scooter for mobility both inside and outside the home.  Has assistive devices for transfers, including a toilet surround and transfer bar in the shower.    LOCALIZED EDEMA:  Mild edema in legs that improves with elevation.  Rodrick previously wore compression socks but currently uses regular socks.    HYPERLIPIDEMIA:  Continue Lipitor for cholesterol management.    FAMILY HISTORY OF GENITOURINARY DISEASE:  Rodrick's younger sister had a history of kidney disease and renal cancer.    PREVENTIVE CARE AND FOLLOW-UP:  Explained benefits of pneumococcal vaccine PCV20, including protection against various pneumococcal strains and prevention of sinus infections, ear infections, and meningitis.  Discussed importance of annual flu vaccination, noting 60% effectiveness in reducing flu risk this season.  Order PCV20 due to lack of documented prior administration.  Rodrick to undergo scheduled colonoscopy in June.  Continue Aspirin.  Have labs drawn at [facility] facility before next appointment.  Contact office or send message through patient portal if any issues arise.         This note  was generated with the assistance of ambient listening technology. Verbal consent was obtained by the patient and accompanying visitor(s) for the recording of patient appointment to facilitate this note. I attest to having reviewed and edited the generated note for accuracy, though some syntax or spelling errors may persist. Please contact the author of this note for any clarification.    Visit today included increased complexity associated with the care of the episodic problems addressed and managing the longitudinal care of the patient due to the serious and/or complex managed problem(s) as per assessment/plan.       Edgar Hager MD  Internal Medicine-Pediatrics

## 2025-03-27 ENCOUNTER — TELEPHONE (OUTPATIENT)
Dept: FAMILY MEDICINE | Facility: CLINIC | Age: 70
End: 2025-03-27
Payer: MEDICARE

## 2025-03-27 DIAGNOSIS — M79.673 PAIN OF FOOT, UNSPECIFIED LATERALITY: Primary | ICD-10-CM

## 2025-03-27 NOTE — TELEPHONE ENCOUNTER
----- Message from Ely sent at 3/26/2025 11:59 AM CDT -----  Type:  Patient Requesting Referral Who Called: Naomi Coon to What Specialty: PodiatryReason for Referral: Sore on side of footDoes the patient want the referral with a specific physician?: Alex Alvarado Is the specialist [facility] or [facility] rather a call back or a response via [facility]? Call [facility] Call Back Number: [phone] Information: Thank you

## 2025-03-27 NOTE — TELEPHONE ENCOUNTER
Patient notified via phone call of podiatry referral placed and contact number for scheduling was given. Understanding and thank you verbalized.

## 2025-03-31 ENCOUNTER — HOSPITAL ENCOUNTER (OUTPATIENT)
Dept: RADIOLOGY | Facility: HOSPITAL | Age: 70
Discharge: HOME | End: 2025-03-31
Attending: PODIATRIST
Payer: MEDICARE

## 2025-03-31 ENCOUNTER — OFFICE VISIT (OUTPATIENT)
Dept: PODIATRY | Facility: CLINIC | Age: 70
End: 2025-03-31
Payer: MEDICARE

## 2025-03-31 ENCOUNTER — PATIENT MESSAGE (OUTPATIENT)
Dept: FAMILY MEDICINE | Facility: CLINIC | Age: 70
End: 2025-03-31
Payer: MEDICARE

## 2025-03-31 VITALS — WEIGHT: 233 LBS | HEIGHT: 69 IN | BODY MASS INDEX: 34.51 KG/M2

## 2025-03-31 DIAGNOSIS — L97.529 ULCER OF LEFT FOOT, UNSPECIFIED ULCER STAGE: ICD-10-CM

## 2025-03-31 DIAGNOSIS — M79.673 PAIN OF FOOT, UNSPECIFIED LATERALITY: ICD-10-CM

## 2025-03-31 DIAGNOSIS — L97.511 ULCER OF RIGHT FOOT, LIMITED TO BREAKDOWN OF SKIN: ICD-10-CM

## 2025-03-31 DIAGNOSIS — E11.49 TYPE II DIABETES MELLITUS WITH NEUROLOGICAL MANIFESTATIONS: Primary | ICD-10-CM

## 2025-03-31 PROCEDURE — 1126F AMNT PAIN NOTED NONE PRSNT: CPT | Mod: CPTII,S$GLB,, | Performed by: PODIATRIST

## 2025-03-31 PROCEDURE — 4010F ACE/ARB THERAPY RXD/TAKEN: CPT | Mod: CPTII,S$GLB,, | Performed by: PODIATRIST

## 2025-03-31 PROCEDURE — 3008F BODY MASS INDEX DOCD: CPT | Mod: CPTII,S$GLB,, | Performed by: PODIATRIST

## 2025-03-31 PROCEDURE — 73630 X-RAY EXAM OF FOOT: CPT | Mod: 26,50,, | Performed by: RADIOLOGY

## 2025-03-31 PROCEDURE — 87070 CULTURE OTHR SPECIMN AEROBIC: CPT | Performed by: PODIATRIST

## 2025-03-31 PROCEDURE — 1159F MED LIST DOCD IN RCRD: CPT | Mod: CPTII,S$GLB,, | Performed by: PODIATRIST

## 2025-03-31 PROCEDURE — 99204 OFFICE O/P NEW MOD 45 MIN: CPT | Mod: S$GLB,,, | Performed by: PODIATRIST

## 2025-03-31 PROCEDURE — 73630 X-RAY EXAM OF FOOT: CPT | Mod: TC,50,FY,PO

## 2025-03-31 PROCEDURE — 3288F FALL RISK ASSESSMENT DOCD: CPT | Mod: CPTII,S$GLB,, | Performed by: PODIATRIST

## 2025-03-31 PROCEDURE — 1101F PT FALLS ASSESS-DOCD LE1/YR: CPT | Mod: CPTII,S$GLB,, | Performed by: PODIATRIST

## 2025-03-31 PROCEDURE — 87075 CULTR BACTERIA EXCEPT BLOOD: CPT | Performed by: PODIATRIST

## 2025-03-31 PROCEDURE — 3044F HG A1C LEVEL LT 7.0%: CPT | Mod: CPTII,S$GLB,, | Performed by: PODIATRIST

## 2025-03-31 PROCEDURE — 99999 PR PBB SHADOW E&M-EST. PATIENT-LVL IV: CPT | Mod: PBBFAC,,, | Performed by: PODIATRIST

## 2025-03-31 RX ORDER — DOXYCYCLINE 100 MG/1
100 CAPSULE ORAL EVERY 12 HOURS
Qty: 20 CAPSULE | Refills: 0 | Status: SHIPPED | OUTPATIENT
Start: 2025-03-31

## 2025-03-31 NOTE — PROGRESS NOTES
Subjective:     Patient ID: Rodrick Pearl is a 69 y.o. male.    Chief Complaint: Diabetes Mellitus (03/21/25 Dr Hager ) and Wound Check    Rodrick is a 69 y.o. male who presents to the clinic for evaluation and treatment of high risk feet. Rodrick has a past medical history of Diabetes mellitus and Hypertension. The patient's chief complaint is diabetic foot ulcer,B/L x several weeks. Presents with family. She thinks wheelchair may rub on patient's left foot causing a wound.   Also reports recent fall in bath tub injuring right foot toes 2,3,4.     PCP: Edgar Hager MD    Date Last Seen by PCP: per above        Hemoglobin A1C   Date Value Ref Range Status   03/21/2025 6.8 (H) 4.0 - 5.6 % Final     Comment:     ADA Screening Guidelines:  5.7-6.4%  Consistent with prediabetes  >or=6.5%  Consistent with diabetes    High levels of fetal hemoglobin interfere with the HbA1C  assay. Heterozygous hemoglobin variants (HbS, HgC, etc)do  not significantly interfere with this assay.   However, presence of multiple variants may affect accuracy.     03/21/2025 6.7 (H) 4.0 - 5.6 % Final     Comment:     ADA Screening Guidelines:  5.7-6.4%  Consistent with prediabetes  >or=6.5%  Consistent with diabetes    High levels of fetal hemoglobin interfere with the HbA1C  assay. Heterozygous hemoglobin variants (HbS, HgC, etc)do  not significantly interfere with this assay.   However, presence of multiple variants may affect accuracy.     04/09/2024 5.6 4.7 - 5.6 % Final   01/10/2024 6.5 (H) <5.7 % Final   07/19/2023 7.3 (H) <5.7 % Final   04/05/2023 8.2 (A) 4.0 - 6.0 % Final       Review of Systems   Constitutional: Negative for chills.   Cardiovascular:  Negative for chest pain and claudication.   Respiratory:  Negative for cough.    Skin:  Positive for color change, dry skin and nail changes.   Musculoskeletal:  Positive for joint pain.   Gastrointestinal:  Negative for nausea.   Neurological:  Positive for paresthesias. Negative for  numbness.   Psychiatric/Behavioral:  The patient is not nervous/anxious.         Objective:     Physical Exam  Constitutional:       Appearance: He is well-developed.      Comments: Oriented to time, place, and person.   Cardiovascular:      Comments: DP and PT pulses are palpable bilaterally. 3 sec capillary refill time and toes and feet are warm to touch proximally .  There is  hair growth on the feet and toes b/l. There is no edema b/l. No spider veins or varicosities present b/l.     Musculoskeletal:      Comments: Equinus noted b/l ankles with < 10 deg DF noted. MMT 5/5 in DF/PF/Inv/Ev resistance with no reproduction of pain in any direction. Passive range of motion of ankle and pedal joints is painless b/l.     Feet:      Right foot:      Skin integrity: No callus or dry skin.      Left foot:      Skin integrity: No callus or dry skin.   Lymphadenopathy:      Comments: Negative lymphadenopathy bilateral popliteal fossa and tarsal tunnel.   Skin:     Comments: Stable eschar right hallux, 2nd and 3rd toe    Left plantar lateral foot ulceration red granular base 0.5cmx0.5cmx0.1cm     Neurological:      Mental Status: He is alert.      Comments: Light touch, proprioception, and sharp/dull sensation are all intact bilaterally. Protective threshold with the Wilcox-Wienstein monofilament is intact bilaterally.    Psychiatric:         Behavior: Behavior is cooperative.       3/31/25:  Right foot.       Left foot         Assessment:      Encounter Diagnoses   Name Primary?    Pain of foot, unspecified laterality     Ulcer of left foot, unspecified ulcer stage     Ulcer of right foot, limited to breakdown of skin     Type II diabetes mellitus with neurological manifestations Yes     Plan:     Rodrick was seen today for diabetes mellitus and wound check.    Diagnoses and all orders for this visit:    Type II diabetes mellitus with neurological manifestations    Pain of foot, unspecified laterality  -     Ambulatory  referral/consult to Podiatry    Ulcer of left foot, unspecified ulcer stage  -     X-Ray Foot Complete Left; Future  -     Aerobic culture (Specify Source)  -     CULTURE, ANAEROBE  -     Ankle Brachial Indices (BREEZY); Future  -     X-Ray Foot Complete Bilateral; Future    Ulcer of right foot, limited to breakdown of skin  -     X-Ray Foot Complete Bilateral; Future    Other orders  -     doxycycline (VIBRAMYCIN) 100 MG Cap; Take 1 capsule (100 mg total) by mouth every 12 (twelve) hours.      I counseled the patient on his conditions, their implications and medical management.      - Shoe inspection. Diabetic Foot Education. Patient reminded of the importance of good nutrition and blood sugar control to help prevent podiatric complications of diabetes. Patient instructed on proper foot hygeine. We discussed wearing proper shoe gear, daily foot inspections, never walking without protective shoe gear, caution putting sharp instruments to feet     - Discussed DM foot care:  Wear comfortable, proper fitting shoes. Wash feet daily. Dry well. After drying, apply moisturizer to feet (no lotion to webspaces). Inspect feet daily for skin breaks, blisters, swelling, or redness. Wear cotton socks (preferably white)  Change socks every day. Do NOT walk barefoot. Do NOT use heating pads or warm/hot water soaks     Debridement: With verbal consent, nonviable tissues on the left foot were debrided beyond sub q utilizing a  sterile No. 3 scalpel and forceps. Minimal bleeding controlled with direct pressure  The patient tolerated this well.   Culture obtained    Xray ordered    Rx. Doxycycline    Instructed on daily betadine application    Nails 1-5 trimmed B/L    Dressings: iodosorb left foot betadine right foot  Offloading:Foam    DARCO shoe dispensed  - right    CAM boot dispensed - left foot    Follow-up:Patient is to return to the clinic in 7-10 days scheduled with Dr. Montes for follow-up but should call [facility] immediately if  any signs of infection, such as fever, chills, sweats, increased redness or pain.    Short-term goals include maintaining good offloading and minimizing bioburden, promoting granulation and epithelialization to healing.  Long-term goals include keeping the wound healed by good offloading and medical management under the direction of internist.

## 2025-04-03 ENCOUNTER — HOSPITAL ENCOUNTER (OUTPATIENT)
Dept: CARDIOLOGY | Facility: HOSPITAL | Age: 70
Discharge: HOME | End: 2025-04-03
Attending: PODIATRIST
Payer: MEDICARE

## 2025-04-03 DIAGNOSIS — L97.529 ULCER OF LEFT FOOT, UNSPECIFIED ULCER STAGE: ICD-10-CM

## 2025-04-03 LAB
BACTERIA SPEC AEROBE CULT: ABNORMAL
BACTERIA SPEC ANAEROBE CULT: NORMAL
LEFT ARM BP: 123 MMHG

## 2025-04-03 PROCEDURE — 93922 UPR/L XTREMITY ART 2 LEVELS: CPT | Mod: 26,,, | Performed by: INTERNAL MEDICINE

## 2025-04-03 PROCEDURE — 93922 UPR/L XTREMITY ART 2 LEVELS: CPT

## 2025-04-03 RX ORDER — INSULIN DEGLUDEC 200 U/ML
200 INJECTION, SOLUTION SUBCUTANEOUS DAILY
Qty: 3 ML | Refills: 0 | Status: SHIPPED | OUTPATIENT
Start: 2025-04-03

## 2025-04-03 NOTE — TELEPHONE ENCOUNTER
No care due was identified.  [facility] Embedded Care Due Messages. Reference number: [id number].   4/03/2025 10:13:09 AM CDT

## 2025-04-10 ENCOUNTER — OFFICE VISIT (OUTPATIENT)
Dept: PODIATRY | Facility: CLINIC | Age: 70
End: 2025-04-10
Payer: MEDICARE

## 2025-04-10 VITALS
WEIGHT: 233 LBS | BODY MASS INDEX: 34.51 KG/M2 | HEIGHT: 69 IN | DIASTOLIC BLOOD PRESSURE: 68 MMHG | SYSTOLIC BLOOD PRESSURE: 155 MMHG

## 2025-04-10 DIAGNOSIS — E11.51 TYPE II DIABETES MELLITUS WITH PERIPHERAL CIRCULATORY DISORDER: ICD-10-CM

## 2025-04-10 DIAGNOSIS — R60.0 EDEMA OF LEFT LOWER EXTREMITY: ICD-10-CM

## 2025-04-10 DIAGNOSIS — L97.511 ULCER OF RIGHT FOOT, LIMITED TO BREAKDOWN OF SKIN: ICD-10-CM

## 2025-04-10 DIAGNOSIS — E11.49 TYPE II DIABETES MELLITUS WITH NEUROLOGICAL MANIFESTATIONS: ICD-10-CM

## 2025-04-10 DIAGNOSIS — L97.529 ULCER OF LEFT FOOT, UNSPECIFIED ULCER STAGE: Primary | ICD-10-CM

## 2025-04-10 PROCEDURE — 4010F ACE/ARB THERAPY RXD/TAKEN: CPT | Mod: CPTII,S$GLB,, | Performed by: PODIATRIST

## 2025-04-10 PROCEDURE — 3008F BODY MASS INDEX DOCD: CPT | Mod: CPTII,S$GLB,, | Performed by: PODIATRIST

## 2025-04-10 PROCEDURE — 1101F PT FALLS ASSESS-DOCD LE1/YR: CPT | Mod: CPTII,S$GLB,, | Performed by: PODIATRIST

## 2025-04-10 PROCEDURE — 3288F FALL RISK ASSESSMENT DOCD: CPT | Mod: CPTII,S$GLB,, | Performed by: PODIATRIST

## 2025-04-10 PROCEDURE — 3078F DIAST BP <80 MM HG: CPT | Mod: CPTII,S$GLB,, | Performed by: PODIATRIST

## 2025-04-10 PROCEDURE — 3077F SYST BP >= 140 MM HG: CPT | Mod: CPTII,S$GLB,, | Performed by: PODIATRIST

## 2025-04-10 PROCEDURE — 99214 OFFICE O/P EST MOD 30 MIN: CPT | Mod: S$GLB,,, | Performed by: PODIATRIST

## 2025-04-10 PROCEDURE — 1159F MED LIST DOCD IN RCRD: CPT | Mod: CPTII,S$GLB,, | Performed by: PODIATRIST

## 2025-04-10 PROCEDURE — 99999 PR PBB SHADOW E&M-EST. PATIENT-LVL V: CPT | Mod: PBBFAC,,, | Performed by: PODIATRIST

## 2025-04-10 PROCEDURE — 1160F RVW MEDS BY RX/DR IN RCRD: CPT | Mod: CPTII,S$GLB,, | Performed by: PODIATRIST

## 2025-04-10 PROCEDURE — 3044F HG A1C LEVEL LT 7.0%: CPT | Mod: CPTII,S$GLB,, | Performed by: PODIATRIST

## 2025-04-10 NOTE — PATIENT INSTRUCTIONS
Wound Care & Healing Support     To ensure optimal healing, please follow these wound care instructions until your next appointment or until you are seen by home health wound care nursing.     1. Keeping Your Wound Dressing Clean, Dry, and Intact   Keep your wound dressing clean, dry, and securely in place at all times.   Avoid getting the dressing wet when showering or bathing. If needed, cover the area with a waterproof barrier (e.g., plastic wrap or waterproof dressing).   Do not remove or change the dressing unless it becomes wet, soiled, or loose.       2. What to Do If the Dressing Becomes Wet or Soiled     If your wound dressing becomes wet, dirty, or falls off, follow these steps immediately:  1. Remove the soiled dressing.  2. Clean the wound gently with antibacterial soap and water (do not scrub).  3. Pat dry with a clean towel (do not rub).  4. If you are not allergic to Betadine (povidone-iodine) or iodine-based products, apply Betadine or iodine to the wound every 12 hours.  5. Re-cover the wound with a clean, dry dressing.  6. Contact our office as soon as possible for further instructions or if you notice any signs of infection (redness, warmth, swelling, foul odor, or increased pain).     3. Signs of Infection - When to Call the Office     ?? Call our office immediately if you notice:   Increased redness, warmth, or swelling around the wound.   Pus, foul-smelling drainage, or excessive bleeding.   Fever or chills.   Sudden increase in pain.  4. Nutrition & Supplements to Support Healing     Proper nutrition is essential for wound healing and infection prevention. Consider incorporating the following into your diet:     ? Protein-Rich Foods (supports tissue repair):   Lean meats, poultry, fish, eggs, tofu, Greek yogurt, and legumes.     ? Vitamin C (boosts collagen production & immune function):   Citrus fruits, strawberries, bell peppers, and dark leafy greens.     ? Zinc (enhances wound healing &  immune defense):   Pumpkin seeds, nuts, beans, and fortified cereals.     ? Vitamin A (promotes skin regeneration):   Carrots, sweet potatoes, and dark leafy greens.     ? Iron (supports oxygen delivery to the wound):   Lean meats, beans, spinach, and iron-fortified grains.     ? Hydration (essential for cell function & healing):   Drink at least 6-8 glasses of water per day to keep your body hydrated and support healing.  Recommended Supplements (If Needed):   Arginine + Glutamine - Enhances wound healing in chronic wounds.   Collagen Peptides - May improve skin integrity and tissue repair.   Omega-3 Fatty Acids - Reduces inflammation and supports tissue repair.     ?? Consult with our office before starting any new supplements, especially if you have medical conditions or take prescription medications.  Follow-Up & Next Steps   Continue wound care as instructed until your next appointment or home health visit.   Bring any questions or concerns to your next visit.   Call our office if you have any concerns about the wound or notice signs of infection.    Nutrition and MyPlate: Protein Foods  This group includes foods that are high in protein. Protein helps the body build new cells and keeps tissues healthy. Most Americans get enough protein without even trying. It can be harder for vegetarians, but plenty of non-meat foods are rich in protein, too. Its best to get protein from a variety of sources.    Nutrient-Rich Choices  Theres a lot more to this food group than just meat and beans. It also includes nuts, seeds, and eggs. There are all sorts of nutrient-rich choices:  Chicken and turkey with the skin removed  Fish and shellfish  Lean beef, pork, or lamb (without visible fat)  Soy products, such as tofu, soybeans (edamame), tempeh, or soymilk  Black beans, kidney beans, merchant beans, chickpeas (garbanzo beans), and lentils (Note: beans and peas count as both a protein and a vegetable)  Peanuts, almonds, walnuts,  sesame seeds, and sunflower  seeds, as well as foods made from these (such as peanut butter or tahini)  Eggs and foods made with eggs (such as quiche or frittata)  What Makes Meat and Beans Less Healthy?  Fatty meat is not healthy. Before you cook meat, trim off all the fat you can see. Chicken and turkey skin is also high in fat, and should be removed before cooking.  Breading and frying make food less healthy. This includes dishes like fried chicken, fried fish, and refried beans.  Sausage and lunch meats tend to be high in fat and salt. Buy low-fat, low-sodium versions.  One Small Change  Make a meal that includes a non-meat source of protein (such as tofu, lentils, or any other food listed above). Have a better idea? Write it here:  _____________________________________________________________  © 8730-0069 [employer]. [address] [place] [address], [place] [address]. All rights reserved. This information is not intended as a substitute for professional medical care. Always follow your healthcare professional's instructions.

## 2025-04-13 NOTE — PROGRESS NOTES
Subjective:     Patient ID: Rodrick Pearl is a 69 y.o. male.    Chief Complaint: Diabetes Mellitus (3/21/25 Dr Hager) and Wound Check    Rodrick is a 69 y.o. male who presents to the clinic for evaluation and treatment of high risk feet. Rodrick has a past medical history of Diabetes mellitus and Hypertension. The patient's chief complaint is diabetic foot ulcer,B/L x several weeks. Presents with family. She thinks wheelchair may rub on patient's left foot causing a wound.   Also reports recent fall in bath tub injuring right foot toes 2,3,4.     4/10/2025 patient returns to clinic for follow up ulcers.  Previously seen by my colleague, new to me.  He relates that he has been attempting to care for the feet as instructed.  Since his last encounter he had BREEZY.  No new pedal complaints.  Denies nausea, vomiting, fever, chills.    PCP: Edgar Hager MD    Date Last Seen by PCP: per above        Hemoglobin A1C   Date Value Ref Range Status   03/21/2025 6.8 (H) 4.0 - 5.6 % Final     Comment:     ADA Screening Guidelines:  5.7-6.4%  Consistent with prediabetes  >or=6.5%  Consistent with diabetes    High levels of fetal hemoglobin interfere with the HbA1C  assay. Heterozygous hemoglobin variants (HbS, HgC, etc)do  not significantly interfere with this assay.   However, presence of multiple variants may affect accuracy.     03/21/2025 6.7 (H) 4.0 - 5.6 % Final     Comment:     ADA Screening Guidelines:  5.7-6.4%  Consistent with prediabetes  >or=6.5%  Consistent with diabetes    High levels of fetal hemoglobin interfere with the HbA1C  assay. Heterozygous hemoglobin variants (HbS, HgC, etc)do  not significantly interfere with this assay.   However, presence of multiple variants may affect accuracy.     04/09/2024 5.6 4.7 - 5.6 % Final   01/10/2024 6.5 (H) <5.7 % Final   07/19/2023 7.3 (H) <5.7 % Final   04/05/2023 8.2 (A) 4.0 - 6.0 % Final         Problem List[1]    Medications Ordered Prior to Encounter[2]    Review of  "patient's allergies indicates:   Allergen Reactions    Netarsudil Other (See Comments), Itching and Swelling     To eyes       Past Surgical History:   Procedure Laterality Date    ENDOSCOPY OF PROXIMAL SMALL INTESTINE N/A 6/3/2024    Procedure: ENTEROSCOPY, PROXIMAL;  Surgeon: Kj Zee MD;  Location: [facility];  Service: Endoscopy;  Laterality: N/A;  upper SBE    FEMUR FRACTURE SURGERY Right     TOE AMPUTATION Left     Great toe    TOE AMPUTATION Right     little toe    TONSILLECTOMY      TOTAL KNEE ARTHROPLASTY Right        Family History   Problem Relation Name Age of Onset    Lung cancer Father      Kidney cancer Sister      Breast cancer Sister radha     Prostate cancer Paternal Uncle      Lung cancer Paternal Uncle      Pancreatic cancer Paternal Aunt         Social History[3]        Review of Systems   Constitutional: Negative for chills.   Cardiovascular:  Negative for chest pain and claudication.   Respiratory:  Negative for cough.    Skin:  Positive for color change, dry skin and nail changes.   Musculoskeletal:  Positive for joint pain.   Gastrointestinal:  Negative for nausea.   Neurological:  Positive for paresthesias. Negative for numbness.   Psychiatric/Behavioral:  The patient is not nervous/anxious.         Objective:     Vitals:    04/10/25 1424   BP: (!) 155/68   Weight: 105.7 kg (233 lb 0.4 oz)   Height: 5' 9" (1.753 m)       Physical Exam  Constitutional:       Appearance: He is well-developed.      Comments: Oriented to time, place, and person.   Cardiovascular:      Comments: DP and PT pulses are palpable bilaterally. 3 sec capillary refill time and toes and feet are warm to touch proximally .  There is  hair growth on the feet and toes b/l. There is no edema b/l. No spider veins or varicosities present b/l.     Musculoskeletal:      Comments: Equinus noted b/l ankles with < 10 deg DF noted. MMT 5/5 in DF/PF/Inv/Ev resistance with no reproduction of pain in any direction. Passive " range of motion of ankle and pedal joints is painless b/l.     Feet:      Right foot:      Skin integrity: No callus or dry skin.      Left foot:      Skin integrity: No callus or dry skin.   Lymphadenopathy:      Comments: Negative lymphadenopathy bilateral popliteal fossa and tarsal tunnel.   Skin:     Comments: Stable eschar right hallux, 2nd and 3rd toe    Left plantar lateral foot ulceration red granular base 0.5cmx0.5cmx0.1cm     Neurological:      Mental Status: He is alert.      Comments: Light touch, proprioception, and sharp/dull sensation are all intact bilaterally. Protective threshold with the [place] monofilament is intact bilaterally.    Psychiatric:         Behavior: Behavior is cooperative.       4/10/2025    Right         Plantar heels        Left              3/31/25:  Right foot.       Left foot       Narrative & Impression  EXAMINATION:  XR FOOT COMPLETE 3 VIEW BILATERAL     CLINICAL HISTORY:  wounds both feet; Non-pressure chronic ulcer of other part of left foot with unspecified severity     TECHNIQUE:  AP, lateral, and oblique views of both feet were performed.     COMPARISON:  None     FINDINGS:  Left: Prior partial resection change of the 5th distal metatarsal and great toe phalanges.  Cortical thickening with possible remote traumatic change or prior infection of the 2nd metatarsal.  Slight contour flattening at the metatarsal head with DJD change at the 2nd MTP.  Deformity with irregularity centered at the 2nd toe PIP, potentially on the basis of remote or indolent infection.  Scattered foot arthritic changes and calcaneal spurring.  Vascular calcification.  No acute displaced fracture.     Right: Prior resection change of the 5th toe phalanges.  Scattered foot arthritic changes and calcaneal spurring.  Vascular calcification.  No acute displaced fracture or seth osseous destruction.     Impression:     As above.        Electronically signed by:Richmond Biswas  Date:                                             04/01/2025  Time:                                           08:30        Noncompressible bilateral lower extremity TBIs.    Noncompressible right lower extremity TBI.    Recommend lower extremity arterial ultrasound.      Assessment:      Encounter Diagnoses   Name Primary?    Ulcer of left foot, unspecified ulcer stage Yes    Ulcer of right foot, limited to breakdown of skin     Type II diabetes mellitus with neurological manifestations     Type II diabetes mellitus with peripheral circulatory disorder     Edema of left lower extremity      Plan:     Rodrick was seen today for diabetes mellitus and wound check.    Diagnoses and all orders for this visit:    Ulcer of left foot, unspecified ulcer stage  -     US Lower Extremity Arteries Bilateral; Future    Ulcer of right foot, limited to breakdown of skin  -     US Lower Extremity Arteries Bilateral; Future    Type II diabetes mellitus with neurological manifestations    Type II diabetes mellitus with peripheral circulatory disorder  -     US Lower Extremity Arteries Bilateral; Future    Edema of left lower extremity      I counseled the patient on his conditions, their implications and medical management.    - all previous pertinent notes, labs, orders reviewed    - Shoe inspection. Diabetic Foot Education. Patient reminded of the importance of good nutrition and blood sugar control to help prevent podiatric complications of diabetes. Patient instructed on proper foot hygeine. We discussed wearing proper shoe gear, daily foot inspections, never walking without protective shoe gear, caution putting sharp instruments to feet     - Discussed DM foot care:  Wear comfortable, proper fitting shoes. Wash feet daily. Dry well. After drying, apply moisturizer to feet (no lotion to webspaces). Inspect feet daily for skin breaks, blisters, swelling, or redness. Wear cotton socks (preferably white)  Change socks every day. Do NOT walk barefoot. Do  NOT use heating pads or warm/hot water soaks     Arterial study ordered for inconclusive BREEZY    Dressings: iodosorb left foot betadine right foot  Offloading:Foam    Follow-up:Patient is to return to the clinic in 7-10 days for follow-up but should call [facility] immediately if any signs of infection, such as fever, chills, sweats, increased redness or pain.    Short-term goals include maintaining good offloading and minimizing bioburden, promoting granulation and epithelialization to healing.  Long-term goals include keeping the wound healed by good offloading and medical management under the direction of internist.             [1]   Patient Active Problem List  Diagnosis    Prostate cancer metastatic to bone    HTN (hypertension)    Paroxysmal atrial fibrillation    Neurogenic bladder    Type 2 diabetes mellitus with circulatory disorder, with long-term current use of insulin    Type 2 diabetes mellitus with both eyes affected by moderate nonproliferative retinopathy and macular edema, with long-term current use of insulin    Peripheral vascular disease    Hypertensive chronic kidney disease with stage 5 chronic kidney disease or end stage renal disease    Suprapubic catheter   [2]   Current Outpatient Medications on File Prior to Visit   Medication Sig Dispense Refill    amLODIPine (NORVASC) 10 MG tablet Take 1 tablet (10 mg total) by mouth once daily. 90 tablet 3    aspirin (ECOTRIN) 81 MG EC tablet Take 81 mg by mouth once daily.      atorvastatin (LIPITOR) 10 MG tablet Take 10 mg by mouth once daily.      brimonidine 0.1% (ALPHAGAN P) 0.1 % Drop Place 1 drop into both eyes 3 (three) times daily.      dorzolamide (TRUSOPT) 2 % ophthalmic solution 1 drop 3 (three) times daily.      doxazosin (CARDURA) 2 MG tablet Take 2 mg by mouth every evening.      doxycycline (VIBRAMYCIN) 100 MG Cap Take 1 capsule (100 mg total) by mouth every 12 (twelve) hours. 20 capsule 0    empagliflozin (JARDIANCE) 25 mg tablet Take 25 mg  by mouth once daily.      ferrous sulfate (FEOSOL) Tab tablet Take 1 tablet by mouth daily with breakfast.      flash glucose sensor (FREESTYLE MARC 2 SENSOR) Kit 1 Device by Misc.(Non-Drug; Combo Route) route once daily. E11.59, Z79.4 1 kit 5    insulin aspart U-100 (NOVOLOG) 100 unit/mL injection Inject into the skin 3 (three) times daily before meals.      insulin degludec (TRESIBA FLEXTOUCH U-200) 200 unit/mL (3 mL) insulin pen Inject 200 Units into the skin once daily. 3 mL 0    metoprolol tartrate (LOPRESSOR) 50 MG tablet Take 1 tablet (50 mg total) by mouth 2 (two) times daily.      pantoprazole (PROTONIX) 40 MG tablet Take 40 mg by mouth once daily.      sodium bicarbonate 650 MG tablet Take 1 tablet by mouth every 8 (eight) hours.      XTANDI 40 mg Tab Take 2 tablets by mouth 2 (two) times daily.       No current facility-administered medications on file prior to visit.   [3]   Social History  Socioeconomic History    Marital status: Unknown   Tobacco Use    Smoking status: Never    Smokeless tobacco: Never   Substance and Sexual Activity    Alcohol use: Not Currently    Drug use: Never   Social History Narrative    Lives in [place] with his wife, Naomi. Also with son Kaz and daughter Althea. Previously worked as .      Social Drivers of Health     Financial Resource Strain: Low Risk  (3/31/2025)    Overall Financial Resource Strain (CARDIA)     Difficulty of Paying Living Expenses: Not very hard   Food Insecurity: No Food Insecurity (3/31/2025)    Hunger Vital Sign     Worried About Running Out of Food in the Last Year: Never true     Ran Out of Food in the Last Year: Never true   Transportation Needs: No Transportation Needs (3/31/2025)    PRAPARE - Transportation     Lack of Transportation (Medical): No     Lack of Transportation (Non-Medical): No   Physical Activity: Inactive (3/31/2025)    Exercise Vital Sign     Days of Exercise per Week: 0 days     Minutes of Exercise per  Session: 0 min   Stress: No Stress Concern Present (3/31/2025)    [employer] [place] [employer] - Occupational Stress Questionnaire     Feeling of Stress : Not at all   Housing Stability: Low Risk  (3/31/2025)    Housing Stability Vital Sign     Unable to Pay for Housing in the Last Year: No     Homeless in the Last Year: No

## 2025-04-14 ENCOUNTER — HOSPITAL ENCOUNTER (OUTPATIENT)
Dept: RADIOLOGY | Facility: HOSPITAL | Age: 70
Discharge: HOME | End: 2025-04-14
Attending: PODIATRIST
Payer: MEDICARE

## 2025-04-14 DIAGNOSIS — L97.529 ULCER OF LEFT FOOT, UNSPECIFIED ULCER STAGE: ICD-10-CM

## 2025-04-14 DIAGNOSIS — L97.511 ULCER OF RIGHT FOOT, LIMITED TO BREAKDOWN OF SKIN: ICD-10-CM

## 2025-04-14 DIAGNOSIS — E11.51 TYPE II DIABETES MELLITUS WITH PERIPHERAL CIRCULATORY DISORDER: ICD-10-CM

## 2025-04-14 PROCEDURE — 93925 LOWER EXTREMITY STUDY: CPT | Mod: 26,,, | Performed by: RADIOLOGY

## 2025-04-14 PROCEDURE — 93925 LOWER EXTREMITY STUDY: CPT | Mod: TC

## 2025-04-17 ENCOUNTER — OFFICE VISIT (OUTPATIENT)
Dept: PODIATRY | Facility: CLINIC | Age: 70
End: 2025-04-17
Payer: MEDICARE

## 2025-04-17 VITALS
WEIGHT: 233 LBS | HEIGHT: 69 IN | DIASTOLIC BLOOD PRESSURE: 77 MMHG | BODY MASS INDEX: 34.51 KG/M2 | SYSTOLIC BLOOD PRESSURE: 115 MMHG

## 2025-04-17 DIAGNOSIS — L97.529 ULCER OF LEFT FOOT, UNSPECIFIED ULCER STAGE: Primary | ICD-10-CM

## 2025-04-17 DIAGNOSIS — E11.49 TYPE II DIABETES MELLITUS WITH NEUROLOGICAL MANIFESTATIONS: ICD-10-CM

## 2025-04-17 DIAGNOSIS — L97.511 ULCER OF RIGHT FOOT, LIMITED TO BREAKDOWN OF SKIN: ICD-10-CM

## 2025-04-17 DIAGNOSIS — E11.51 TYPE II DIABETES MELLITUS WITH PERIPHERAL CIRCULATORY DISORDER: ICD-10-CM

## 2025-04-17 PROCEDURE — 3008F BODY MASS INDEX DOCD: CPT | Mod: CPTII,S$GLB,, | Performed by: PODIATRIST

## 2025-04-17 PROCEDURE — 1126F AMNT PAIN NOTED NONE PRSNT: CPT | Mod: CPTII,S$GLB,, | Performed by: PODIATRIST

## 2025-04-17 PROCEDURE — 4010F ACE/ARB THERAPY RXD/TAKEN: CPT | Mod: CPTII,S$GLB,, | Performed by: PODIATRIST

## 2025-04-17 PROCEDURE — 3044F HG A1C LEVEL LT 7.0%: CPT | Mod: CPTII,S$GLB,, | Performed by: PODIATRIST

## 2025-04-17 PROCEDURE — 1101F PT FALLS ASSESS-DOCD LE1/YR: CPT | Mod: CPTII,S$GLB,, | Performed by: PODIATRIST

## 2025-04-17 PROCEDURE — 99214 OFFICE O/P EST MOD 30 MIN: CPT | Mod: S$GLB,,, | Performed by: PODIATRIST

## 2025-04-17 PROCEDURE — 3288F FALL RISK ASSESSMENT DOCD: CPT | Mod: CPTII,S$GLB,, | Performed by: PODIATRIST

## 2025-04-17 PROCEDURE — 3074F SYST BP LT 130 MM HG: CPT | Mod: CPTII,S$GLB,, | Performed by: PODIATRIST

## 2025-04-17 PROCEDURE — 3078F DIAST BP <80 MM HG: CPT | Mod: CPTII,S$GLB,, | Performed by: PODIATRIST

## 2025-04-17 PROCEDURE — 99999 PR PBB SHADOW E&M-EST. PATIENT-LVL V: CPT | Mod: PBBFAC,,, | Performed by: PODIATRIST

## 2025-04-17 NOTE — PATIENT INSTRUCTIONS
Wound care Instructions:   Once a day beginning in 3 days:   Clean the toe separate from your body with warm running water and antibacterial soap such as dial for five minutes.  Clean any remaining crust away with soap and water using a cotton-tipped applicator.  DRY COMPLETELY  Apply iodosorb to the right great toe.  Cover with a breathable bandage until there is no more drainage or open flesh.  Change the dressing daily, or whenever it becomes wet or dirty.  If you were prescribed antibiotics, take them as directed until they are all gone.  Wear darco shoe  You may use acetaminophen or ibuprofen to control pain, unless another medicine was prescribed. If you have chronic liver or kidney disease or ever had a stomach ulcer or GI bleeding, talk with your doctor before using these medicines.      When to seek medical advice  Call your health care provider right away if any of the following occur:  Increasing redness, pain or swelling of the toe  Red streaks in the skin leading away from the wound  Continued pus or fluid drainage for more than 24 hours  Fever of 100.4º F (38º C) or higher, or as directed by your health care provider

## 2025-04-18 DIAGNOSIS — Z79.4 TYPE 2 DIABETES MELLITUS WITH OTHER CIRCULATORY COMPLICATION, WITH LONG-TERM CURRENT USE OF INSULIN: Primary | ICD-10-CM

## 2025-04-18 DIAGNOSIS — E11.59 TYPE 2 DIABETES MELLITUS WITH OTHER CIRCULATORY COMPLICATION, WITH LONG-TERM CURRENT USE OF INSULIN: Primary | ICD-10-CM

## 2025-04-19 NOTE — TELEPHONE ENCOUNTER
No care due was identified.  [facility] Embedded Care Due Messages. Reference number: [id number].   4/18/2025 7:23:47 PM CDT

## 2025-04-21 RX ORDER — INSULIN DEGLUDEC 200 U/ML
46 INJECTION, SOLUTION SUBCUTANEOUS NIGHTLY
Qty: 20 ML | Refills: 3 | Status: SHIPPED | OUTPATIENT
Start: 2025-04-21

## 2025-04-21 NOTE — PROGRESS NOTES
Subjective:     Patient ID: Rodrick Pearl is a 69 y.o. male.    Chief Complaint: Wound Check and Diabetes Mellitus (3/21/25 Dr Hager)    Rodrick is a 69 y.o. male who presents to the clinic for evaluation and treatment of high risk feet. Rodrick has a past medical history of Diabetes mellitus and Hypertension. The patient's chief complaint is diabetic foot ulcer,B/L x several weeks. Presents with family. She thinks wheelchair may rub on patient's left foot causing a wound.   Also reports recent fall in bath tub injuring right foot toes 2,3,4.     4/10/2025 patient returns to clinic for follow up ulcers.  Previously seen by my colleague, new to me.  He relates that he has been attempting to care for the feet as instructed.  Since his last encounter he had BREEZY.  No new pedal complaints.  Denies nausea, vomiting, fever, chills.    4/17/2025 patient returns to clinic for follow up ulcers.  Previously seen by my colleague, new to me.  He relates that he has been attempting to care for the feet as instructed.  No new pedal complaints.  Denies nausea, vomiting, fever, chills.    PCP: Edgar Hager MD    Date Last Seen by PCP: per above        Hemoglobin A1C   Date Value Ref Range Status   03/21/2025 6.8 (H) 4.0 - 5.6 % Final     Comment:     ADA Screening Guidelines:  5.7-6.4%  Consistent with prediabetes  >or=6.5%  Consistent with diabetes    High levels of fetal hemoglobin interfere with the HbA1C  assay. Heterozygous hemoglobin variants (HbS, HgC, etc)do  not significantly interfere with this assay.   However, presence of multiple variants may affect accuracy.     03/21/2025 6.7 (H) 4.0 - 5.6 % Final     Comment:     ADA Screening Guidelines:  5.7-6.4%  Consistent with prediabetes  >or=6.5%  Consistent with diabetes    High levels of fetal hemoglobin interfere with the HbA1C  assay. Heterozygous hemoglobin variants (HbS, HgC, etc)do  not significantly interfere with this assay.   However, presence of multiple variants may  "affect accuracy.     04/09/2024 5.6 4.7 - 5.6 % Final   01/10/2024 6.5 (H) <5.7 % Final   07/19/2023 7.3 (H) <5.7 % Final   04/05/2023 8.2 (A) 4.0 - 6.0 % Final         Problem List[1]    Medications Ordered Prior to Encounter[2]    Review of patient's allergies indicates:   Allergen Reactions    Netarsudil Other (See Comments), Itching and Swelling     To eyes       Past Surgical History:   Procedure Laterality Date    ENDOSCOPY OF PROXIMAL SMALL INTESTINE N/A 6/3/2024    Procedure: ENTEROSCOPY, PROXIMAL;  Surgeon: Kj Zee MD;  Location: [facility];  Service: Endoscopy;  Laterality: N/A;  upper SBE    FEMUR FRACTURE SURGERY Right     TOE AMPUTATION Left     Great toe    TOE AMPUTATION Right     little toe    TONSILLECTOMY      TOTAL KNEE ARTHROPLASTY Right        Family History   Problem Relation Name Age of Onset    Lung cancer Father      Kidney cancer Sister      Breast cancer Sister radha     Prostate cancer Paternal Uncle      Lung cancer Paternal Uncle      Pancreatic cancer Paternal Aunt         Social History[3]        Review of Systems   Constitutional: Negative for chills.   Cardiovascular:  Negative for chest pain and claudication.   Respiratory:  Negative for cough.    Skin:  Positive for color change, dry skin and nail changes.   Musculoskeletal:  Positive for joint pain.   Gastrointestinal:  Negative for nausea.   Neurological:  Positive for paresthesias. Negative for numbness.   Psychiatric/Behavioral:  The patient is not nervous/anxious.         Objective:     Vitals:    04/17/25 1420   BP: 115/77   Weight: 105.7 kg (233 lb 0.4 oz)   Height: 5' 9" (1.753 m)   PainSc: 0-No pain       Physical Exam  Constitutional:       Appearance: He is well-developed.      Comments: Oriented to time, place, and person.   Cardiovascular:      Comments: DP and PT pulses are palpable bilaterally. 3 sec capillary refill time and toes and feet are warm to touch proximally .  There is  hair growth on the feet " and toes b/l. There is no edema b/l. No spider veins or varicosities present b/l.     Musculoskeletal:      Comments: Equinus noted b/l ankles with < 10 deg DF noted. MMT 5/5 in DF/PF/Inv/Ev resistance with no reproduction of pain in any direction. Passive range of motion of ankle and pedal joints is painless b/l.     Feet:      Right foot:      Skin integrity: Ulcer and dry skin present. No callus.      Left foot:      Skin integrity: Ulcer and dry skin present. No callus.   Lymphadenopathy:      Comments: Negative lymphadenopathy bilateral popliteal fossa and tarsal tunnel.   Skin:     Comments: Hematoma right hallux    Left plantar lateral foot ulceration red granular base 0.5cmx0.3cmx0.1cm     Neurological:      Mental Status: He is alert.      Comments: Light touch, proprioception, and sharp/dull sensation are all intact bilaterally. Protective threshold with the [place] monofilament is intact bilaterally.    Psychiatric:         Behavior: Behavior is cooperative.       4/17/2025                        4/10/2025    Right         Plantar heels        Left              3/31/25:  Right foot.       Left foot       Narrative & Impression  EXAMINATION:  XR FOOT COMPLETE 3 VIEW BILATERAL     CLINICAL HISTORY:  wounds both feet; Non-pressure chronic ulcer of other part of left foot with unspecified severity     TECHNIQUE:  AP, lateral, and oblique views of both feet were performed.     COMPARISON:  None     FINDINGS:  Left: Prior partial resection change of the 5th distal metatarsal and great toe phalanges.  Cortical thickening with possible remote traumatic change or prior infection of the 2nd metatarsal.  Slight contour flattening at the metatarsal head with DJD change at the 2nd MTP.  Deformity with irregularity centered at the 2nd toe PIP, potentially on the basis of remote or indolent infection.  Scattered foot arthritic changes and calcaneal spurring.  Vascular calcification.  No acute displaced  fracture.     Right: Prior resection change of the 5th toe phalanges.  Scattered foot arthritic changes and calcaneal spurring.  Vascular calcification.  No acute displaced fracture or seth osseous destruction.     Impression:     As above.        Electronically signed by:Richmond Biswas  Date:                                            04/01/2025  Time:                                           08:30        Noncompressible bilateral lower extremity TBIs.    Noncompressible right lower extremity TBI.    Recommend lower extremity arterial ultrasound.      Assessment:      Encounter Diagnoses   Name Primary?    Ulcer of left foot, unspecified ulcer stage Yes    Ulcer of right foot, limited to breakdown of skin     Type II diabetes mellitus with neurological manifestations     Type II diabetes mellitus with peripheral circulatory disorder      Plan:     Rodrick was seen today for wound check and diabetes mellitus.    Diagnoses and all orders for this visit:    Ulcer of left foot, unspecified ulcer stage  -     Ambulatory referral/consult to Interventional Cardiology; Future    Ulcer of right foot, limited to breakdown of skin  -     Ambulatory referral/consult to Interventional Cardiology; Future    Type II diabetes mellitus with neurological manifestations    Type II diabetes mellitus with peripheral circulatory disorder  -     Ambulatory referral/consult to Interventional Cardiology; Future      I counseled the patient on his conditions, their implications and medical management.    - Shoe inspection. Diabetic Foot Education. Patient reminded of the importance of good nutrition and blood sugar control to help prevent podiatric complications of diabetes. Patient instructed on proper foot hygeine. We discussed wearing proper shoe gear, daily foot inspections, never walking without protective shoe gear, caution putting sharp instruments to feet     - Discussed DM foot care:  Wear comfortable, proper fitting shoes. Wash  feet daily. Dry well. After drying, apply moisturizer to feet (no lotion to webspaces). Inspect feet daily for skin breaks, blisters, swelling, or redness. Wear cotton socks (preferably white)  Change socks every day. Do NOT walk barefoot. Do NOT use heating pads or warm/hot water soaks     Arterial ultrasound shows decreased perfusion without significant stenosis.  Nevertheless I believe patient will benefit from Interventional Cardiology.  Consult placed    Dressings: iodosorb bilaterally   Offloading:Foam foot balls    Detailed home care instructions discussed and dispensed    Follow-up:Patient is to return to the clinic in 7-10 days for follow-up but [facility] if any signs of infection, such as fever, chills, sweats, increased redness or pain.    Short-term goals include maintaining good offloading and minimizing bioburden, promoting granulation and epithelialization to healing.  Long-term goals include keeping the wound healed by good offloading and medical management under the direction of internist.               [1]   Patient Active Problem List  Diagnosis    Prostate cancer metastatic to bone    HTN (hypertension)    Paroxysmal atrial fibrillation    Neurogenic bladder    Type 2 diabetes mellitus with circulatory disorder, with long-term current use of insulin    Type 2 diabetes mellitus with both eyes affected by moderate nonproliferative retinopathy and macular edema, with long-term current use of insulin    Peripheral vascular disease    Hypertensive chronic kidney disease with stage 5 chronic kidney disease or end stage renal disease    Suprapubic catheter   [2]   Current Outpatient Medications on File Prior to Visit   Medication Sig Dispense Refill    amLODIPine (NORVASC) 10 MG tablet Take 1 tablet (10 mg total) by mouth once daily. 90 tablet 3    aspirin (ECOTRIN) 81 MG EC tablet Take 81 mg by mouth once daily.      atorvastatin (LIPITOR) 10 MG tablet Take 10 mg by mouth once  daily.      brimonidine 0.1% (ALPHAGAN P) 0.1 % Drop Place 1 drop into both eyes 3 (three) times daily.      dorzolamide (TRUSOPT) 2 % ophthalmic solution 1 drop 3 (three) times daily.      doxazosin (CARDURA) 2 MG tablet Take 2 mg by mouth every evening.      doxycycline (VIBRAMYCIN) 100 MG Cap Take 1 capsule (100 mg total) by mouth every 12 (twelve) hours. 20 capsule 0    empagliflozin (JARDIANCE) 25 mg tablet Take 25 mg by mouth once daily.      ferrous sulfate (FEOSOL) Tab tablet Take 1 tablet by mouth daily with breakfast.      flash glucose sensor (FREESTYLE MARC 2 SENSOR) Kit 1 Device by Misc.(Non-Drug; Combo Route) route once daily. E11.59, Z79.4 1 kit 5    insulin aspart U-100 (NOVOLOG) 100 unit/mL injection Inject into the skin 3 (three) times daily before meals.      metoprolol tartrate (LOPRESSOR) 50 MG tablet Take 1 tablet (50 mg total) by mouth 2 (two) times daily.      pantoprazole (PROTONIX) 40 MG tablet Take 40 mg by mouth once daily.      sodium bicarbonate 650 MG tablet Take 1 tablet by mouth every 8 (eight) hours.      XTANDI 40 mg Tab Take 2 tablets by mouth 2 (two) times daily.       No current facility-administered medications on file prior to visit.   [3]   Social History  Socioeconomic History    Marital status: Unknown   Tobacco Use    Smoking status: Never    Smokeless tobacco: Never   Substance and Sexual Activity    Alcohol use: Not Currently    Drug use: Never   Social History Narrative    Lives in [place] with his wife, Naomi. Also with son Kaz and daughter Althea. Previously worked as .      Social Drivers of Health     Financial Resource Strain: Low Risk  (4/18/2025)    Received from [facility]    Overall Financial Resource Strain (CARDIA)     Difficulty of Paying Living Expenses: Not very hard   Food Insecurity: Food Insecurity Present (4/18/2025)    Received from [facility]    Hunger Vital Sign     Worried About Running Out of Food in the Last Year: Never  true     Ran Out of Food in the Last Year: Sometimes true   Transportation Needs: No Transportation Needs (4/18/2025)    Received from [facility]    PRAPARE - Transportation     Lack of Transportation (Medical): No     Lack of Transportation (Non-Medical): No   Physical Activity: Inactive (4/18/2025)    Received from [facility]    Exercise Vital Sign     Days of Exercise per Week: 0 days     Minutes of Exercise per Session: 0 min   Stress: No Stress Concern Present (4/18/2025)    Received from [facility]    [employer] [place] - Occupational Stress Questionnaire     Feeling of Stress : Not at all   Housing Stability: Unknown (4/18/2025)    Received from [facility]    Housing Stability Vital Sign     Unable to Pay for Housing in the Last Year: No

## 2025-04-24 ENCOUNTER — OFFICE VISIT (OUTPATIENT)
Dept: PODIATRY | Facility: CLINIC | Age: 70
End: 2025-04-24
Payer: MEDICARE

## 2025-04-24 VITALS
SYSTOLIC BLOOD PRESSURE: 174 MMHG | DIASTOLIC BLOOD PRESSURE: 77 MMHG | HEIGHT: 69 IN | BODY MASS INDEX: 34.51 KG/M2 | WEIGHT: 233 LBS

## 2025-04-24 DIAGNOSIS — E11.49 TYPE II DIABETES MELLITUS WITH NEUROLOGICAL MANIFESTATIONS: Primary | ICD-10-CM

## 2025-04-24 DIAGNOSIS — L97.529 ULCER OF LEFT FOOT, UNSPECIFIED ULCER STAGE: ICD-10-CM

## 2025-04-24 DIAGNOSIS — L97.511 ULCER OF RIGHT FOOT, LIMITED TO BREAKDOWN OF SKIN: ICD-10-CM

## 2025-04-24 PROCEDURE — 3044F HG A1C LEVEL LT 7.0%: CPT | Mod: CPTII,S$GLB,, | Performed by: PODIATRIST

## 2025-04-24 PROCEDURE — 3078F DIAST BP <80 MM HG: CPT | Mod: CPTII,S$GLB,, | Performed by: PODIATRIST

## 2025-04-24 PROCEDURE — 99213 OFFICE O/P EST LOW 20 MIN: CPT | Mod: S$GLB,,, | Performed by: PODIATRIST

## 2025-04-24 PROCEDURE — 1101F PT FALLS ASSESS-DOCD LE1/YR: CPT | Mod: CPTII,S$GLB,, | Performed by: PODIATRIST

## 2025-04-24 PROCEDURE — 1126F AMNT PAIN NOTED NONE PRSNT: CPT | Mod: CPTII,S$GLB,, | Performed by: PODIATRIST

## 2025-04-24 PROCEDURE — 99999 PR PBB SHADOW E&M-EST. PATIENT-LVL IV: CPT | Mod: PBBFAC,,, | Performed by: PODIATRIST

## 2025-04-24 PROCEDURE — 3008F BODY MASS INDEX DOCD: CPT | Mod: CPTII,S$GLB,, | Performed by: PODIATRIST

## 2025-04-24 PROCEDURE — 3288F FALL RISK ASSESSMENT DOCD: CPT | Mod: CPTII,S$GLB,, | Performed by: PODIATRIST

## 2025-04-24 PROCEDURE — 3077F SYST BP >= 140 MM HG: CPT | Mod: CPTII,S$GLB,, | Performed by: PODIATRIST

## 2025-04-24 PROCEDURE — 1159F MED LIST DOCD IN RCRD: CPT | Mod: CPTII,S$GLB,, | Performed by: PODIATRIST

## 2025-04-24 PROCEDURE — 4010F ACE/ARB THERAPY RXD/TAKEN: CPT | Mod: CPTII,S$GLB,, | Performed by: PODIATRIST

## 2025-04-24 NOTE — PROGRESS NOTES
Subjective:     Patient ID: Rodrick Pearl is a 69 y.o. male.    Chief Complaint: Diabetes Mellitus (3/22/25 Dr Hager) and Wound Check    Rodrick is a 69 y.o. male who presents to the clinic for evaluation and treatment of high risk feet. Rodrick has a past medical history of Diabetes mellitus and Hypertension. The patient's chief complaint is diabetic foot ulcer,B/L x several weeks. Presents with family. She thinks wheelchair may rub on patient's left foot causing a wound.   Also reports recent fall in bath tub injuring right foot toes 2,3,4.     4/10/2025 patient returns to clinic for follow up ulcers.  Previously seen by my colleague, new to me.  He relates that he has been attempting to care for the feet as instructed.  Since his last encounter he had BREEZY.  No new pedal complaints.  Denies nausea, vomiting, fever, chills.    4/17/2025 patient returns to clinic for follow up ulcers.  Previously seen by my colleague, new to me.  He relates that he has been attempting to care for the feet as instructed.  No new pedal complaints.  Denies nausea, vomiting, fever, chills.    4/24/25: F/u B/L ulcers. Reports applying iodosorb left foot daily.     PCP: Edgar Hager MD    Date Last Seen by PCP: per above        Hemoglobin A1C   Date Value Ref Range Status   03/21/2025 6.8 (H) 4.0 - 5.6 % Final     Comment:     ADA Screening Guidelines:  5.7-6.4%  Consistent with prediabetes  >or=6.5%  Consistent with diabetes    High levels of fetal hemoglobin interfere with the HbA1C  assay. Heterozygous hemoglobin variants (HbS, HgC, etc)do  not significantly interfere with this assay.   However, presence of multiple variants may affect accuracy.     03/21/2025 6.7 (H) 4.0 - 5.6 % Final     Comment:     ADA Screening Guidelines:  5.7-6.4%  Consistent with prediabetes  >or=6.5%  Consistent with diabetes    High levels of fetal hemoglobin interfere with the HbA1C  assay. Heterozygous hemoglobin variants (HbS, HgC, etc)do  not significantly  "interfere with this assay.   However, presence of multiple variants may affect accuracy.     04/09/2024 5.6 4.7 - 5.6 % Final   01/10/2024 6.5 (H) <5.7 % Final   07/19/2023 7.3 (H) <5.7 % Final   04/05/2023 8.2 (A) 4.0 - 6.0 % Final         Problem List[1]    Medications Ordered Prior to Encounter[2]    Review of patient's allergies indicates:   Allergen Reactions    Netarsudil Other (See Comments), Itching and Swelling     To eyes       Past Surgical History:   Procedure Laterality Date    ENDOSCOPY OF PROXIMAL SMALL INTESTINE N/A 6/3/2024    Procedure: ENTEROSCOPY, PROXIMAL;  Surgeon: Kj Zee MD;  Location: [facility];  Service: Endoscopy;  Laterality: N/A;  upper SBE    FEMUR FRACTURE SURGERY Right     TOE AMPUTATION Left     Great toe    TOE AMPUTATION Right     little toe    TONSILLECTOMY      TOTAL KNEE ARTHROPLASTY Right        Family History   Problem Relation Name Age of Onset    Lung cancer Father      Kidney cancer Sister      Breast cancer Sister radha     Prostate cancer Paternal Uncle      Lung cancer Paternal Uncle      Pancreatic cancer Paternal Aunt         Social History[3]        Review of Systems   Constitutional: Negative for chills.   Cardiovascular:  Negative for chest pain and claudication.   Respiratory:  Negative for cough.    Skin:  Positive for color change, dry skin and nail changes.   Musculoskeletal:  Positive for joint pain.   Gastrointestinal:  Negative for nausea.   Neurological:  Positive for paresthesias. Negative for numbness.   Psychiatric/Behavioral:  The patient is not nervous/anxious.         Objective:     Vitals:    04/24/25 1509   BP: (!) 174/77   Weight: 105.7 kg (233 lb 0.4 oz)   Height: 5' 9" (1.753 m)   PainSc: 0-No pain       Physical Exam  Constitutional:       Appearance: He is well-developed.      Comments: Oriented to time, place, and person.   Cardiovascular:      Comments: DP and PT pulses are palpable bilaterally. 3 sec capillary refill time and toes " and feet are warm to touch proximally .  There is  hair growth on the feet and toes b/l. There is no edema b/l. No spider veins or varicosities present b/l.     Musculoskeletal:      Comments: Equinus noted b/l ankles with < 10 deg DF noted. MMT 5/5 in DF/PF/Inv/Ev resistance with no reproduction of pain in any direction. Passive range of motion of ankle and pedal joints is painless b/l.     Feet:      Right foot:      Skin integrity: Ulcer and dry skin present. No callus.      Left foot:      Skin integrity: Ulcer and dry skin present. No callus.   Lymphadenopathy:      Comments: Negative lymphadenopathy bilateral popliteal fossa and tarsal tunnel.   Skin:     Comments: Hematoma right hallux    Left plantar lateral foot ulceration red granular base 0.5cmx0.3cmx0.1cm     Neurological:      Mental Status: He is alert.      Comments: Light touch, proprioception, and sharp/dull sensation are all intact bilaterally. Protective threshold with the [place]-Wienstein monofilament is intact bilaterally.    Psychiatric:         Behavior: Behavior is cooperative.       4/17/2025                        4/10/2025    Right         Plantar heels        Left              3/31/25:  Right foot.       Left foot       Narrative & Impression  EXAMINATION:  XR FOOT COMPLETE 3 VIEW BILATERAL     CLINICAL HISTORY:  wounds both feet; Non-pressure chronic ulcer of other part of left foot with unspecified severity     TECHNIQUE:  AP, lateral, and oblique views of both feet were performed.     COMPARISON:  None     FINDINGS:  Left: Prior partial resection change of the 5th distal metatarsal and great toe phalanges.  Cortical thickening with possible remote traumatic change or prior infection of the 2nd metatarsal.  Slight contour flattening at the metatarsal head with DJD change at the 2nd MTP.  Deformity with irregularity centered at the 2nd toe PIP, potentially on the basis of remote or indolent infection.  Scattered foot arthritic changes  and calcaneal spurring.  Vascular calcification.  No acute displaced fracture.     Right: Prior resection change of the 5th toe phalanges.  Scattered foot arthritic changes and calcaneal spurring.  Vascular calcification.  No acute displaced fracture or seth osseous destruction.     Impression:     As above.        Electronically signed by:Richmond Biswas  Date:                                            04/01/2025  Time:                                           08:30        Noncompressible bilateral lower extremity TBIs.    Noncompressible right lower extremity TBI.    Recommend lower extremity arterial ultrasound.      Assessment:      Encounter Diagnoses   Name Primary?    Ulcer of left foot, unspecified ulcer stage     Ulcer of right foot, limited to breakdown of skin     Type II diabetes mellitus with neurological manifestations Yes       Plan:     Rodrick was seen today for diabetes mellitus and wound check.    Diagnoses and all orders for this visit:    Type II diabetes mellitus with neurological manifestations    Ulcer of left foot, unspecified ulcer stage  -     Ambulatory referral/consult to Wound Clinic; Future  -     SUBSEQUENT HOME HEALTH ORDERS    Ulcer of right foot, limited to breakdown of skin  -     Ambulatory referral/consult to Wound Clinic; Future  -     SUBSEQUENT HOME HEALTH ORDERS        I counseled the patient on his conditions, their implications and medical management.      Debridement: With verbal consent, nonviable tissues on the left foot were debrided beyond sub q utilizing a  sterile No. 3 scalpel and forceps. Minimal bleeding controlled with direct pressure  The patient tolerated this well.     Dressings: iodosorb  Offloading:Foam    Follow-up:Patient is to return to the clinic in 1 week  for follow-up but should call [facility] immediately if any signs of infection, such as fever, chills, sweats, increased redness or pain.    Short-term goals include maintaining good offloading and  minimizing bioburden, promoting granulation and epithelialization to healing.  Long-term goals include keeping the wound healed by good offloading and medical management under the direction of internist.    - Shoe inspection. Diabetic Foot Education. Patient reminded of the importance of good nutrition and blood sugar control to help prevent podiatric complications of diabetes. Patient instructed on proper foot hygeine. We discussed wearing proper shoe gear, daily foot inspections, never walking without protective shoe gear, caution putting sharp instruments to feet     - Discussed DM foot care:  Wear comfortable, proper fitting shoes. Wash feet daily. Dry well. After drying, apply moisturizer to feet (no lotion to webspaces). Inspect feet daily for skin breaks, blisters, swelling, or redness. Wear cotton socks (preferably white)  Change socks every day. Do NOT walk barefoot. Do NOT use heating pads or warm/hot water soaks     Arterial study ordered for inconclusive BREEZY  [facility] appt scheduled for 5/12/25.  Dressings: iodosorb left foot betadine right foot  Offloading:Foam    Follow-up:Patient is to return to the clinic in 7-10 days for follow-up but should call [facility] immediately if any signs of infection, such as fever, chills, sweats, increased redness or pain.    Short-term goals include maintaining good offloading and minimizing bioburden, promoting granulation and epithelialization to healing.  Long-term goals include keeping the wound healed by good offloading and medical management under the direction of internist.                 [1]   Patient Active Problem List  Diagnosis    Prostate cancer metastatic to bone    HTN (hypertension)    Paroxysmal atrial fibrillation    Neurogenic bladder    Type 2 diabetes mellitus with circulatory disorder, with long-term current use of insulin    Type 2 diabetes mellitus with both eyes affected by moderate nonproliferative retinopathy and macular edema, with long-term  current use of insulin    Peripheral vascular disease    Hypertensive chronic kidney disease with stage 5 chronic kidney disease or end stage renal disease    Suprapubic catheter   [2]   Current Outpatient Medications on File Prior to Visit   Medication Sig Dispense Refill    amLODIPine (NORVASC) 10 MG tablet Take 1 tablet (10 mg total) by mouth once daily. 90 tablet 3    aspirin (ECOTRIN) 81 MG EC tablet Take 81 mg by mouth once daily.      atorvastatin (LIPITOR) 10 MG tablet Take 10 mg by mouth once daily.      brimonidine 0.1% (ALPHAGAN P) 0.1 % Drop Place 1 drop into both eyes 3 (three) times daily.      dorzolamide (TRUSOPT) 2 % ophthalmic solution 1 drop 3 (three) times daily.      doxazosin (CARDURA) 2 MG tablet Take 2 mg by mouth every evening.      doxycycline (VIBRAMYCIN) 100 MG Cap Take 1 capsule (100 mg total) by mouth every 12 (twelve) hours. 20 capsule 0    empagliflozin (JARDIANCE) 25 mg tablet Take 25 mg by mouth once daily.      ferrous sulfate (FEOSOL) Tab tablet Take 1 tablet by mouth daily with breakfast.      flash glucose sensor (FREESTYLE MARC 2 SENSOR) Kit 1 Device by Misc.(Non-Drug; Combo Route) route once daily. E11.59, Z79.4 1 kit 5    insulin aspart U-100 (NOVOLOG) 100 unit/mL injection Inject into the skin 3 (three) times daily before meals.      insulin degludec (TRESIBA FLEXTOUCH U-200) 200 unit/mL (3 mL) insulin pen Inject 46 Units into the skin every evening. 20 mL 3    metoprolol tartrate (LOPRESSOR) 50 MG tablet Take 1 tablet (50 mg total) by mouth 2 (two) times daily.      pantoprazole (PROTONIX) 40 MG tablet Take 40 mg by mouth once daily.      sodium bicarbonate 650 MG tablet Take 1 tablet by mouth every 8 (eight) hours.      XTANDI 40 mg Tab Take 2 tablets by mouth 2 (two) times daily.       No current facility-administered medications on file prior to visit.   [3]   Social History  Socioeconomic History    Marital status: Unknown   Tobacco Use    Smoking status: Never     Smokeless tobacco: Never   Substance and Sexual Activity    Alcohol use: Not Currently    Drug use: Never   Social History Narrative    Lives in [place] with his wife, Naomi. Also with son Kaz and daughter Althea. Previously worked as .      Social Drivers of Health     Financial Resource Strain: Low Risk  (4/18/2025)    Received from [facility]    Overall Financial Resource Strain (CARDIA)     Difficulty of Paying Living Expenses: Not very hard   Food Insecurity: Food Insecurity Present (4/18/2025)    Received from [facility]    Hunger Vital Sign     Worried About Running Out of Food in the Last Year: Never true     Ran Out of Food in the Last Year: Sometimes true   Transportation Needs: No Transportation Needs (4/18/2025)    Received from [facility]    PRAPARE - Transportation     Lack of Transportation (Medical): No     Lack of Transportation (Non-Medical): No   Physical Activity: Inactive (4/18/2025)    Received from [facility]    Exercise Vital Sign     Days of Exercise per Week: 0 days     Minutes of Exercise per Session: 0 min   Stress: No Stress Concern Present (4/18/2025)    Received from [facility]    [employer] [place] [employer] - Occupational Stress Questionnaire     Feeling of Stress : Not at all   Housing Stability: Unknown (4/18/2025)    Received from [facility]    Housing Stability Vital Sign     Unable to Pay for Housing in the Last Year: No

## 2025-04-30 DIAGNOSIS — E11.9 TYPE 2 DIABETES MELLITUS WITHOUT COMPLICATION: ICD-10-CM

## 2025-05-01 ENCOUNTER — OFFICE VISIT (OUTPATIENT)
Dept: PODIATRY | Facility: CLINIC | Age: 70
End: 2025-05-01
Payer: MEDICARE

## 2025-05-01 VITALS — BODY MASS INDEX: 34.51 KG/M2 | WEIGHT: 233 LBS | HEIGHT: 69 IN

## 2025-05-01 DIAGNOSIS — L97.511 ULCER OF RIGHT FOOT, LIMITED TO BREAKDOWN OF SKIN: ICD-10-CM

## 2025-05-01 DIAGNOSIS — L97.529 ULCER OF LEFT FOOT, UNSPECIFIED ULCER STAGE: ICD-10-CM

## 2025-05-01 DIAGNOSIS — E11.49 TYPE II DIABETES MELLITUS WITH NEUROLOGICAL MANIFESTATIONS: Primary | ICD-10-CM

## 2025-05-01 PROCEDURE — 3044F HG A1C LEVEL LT 7.0%: CPT | Mod: CPTII,S$GLB,, | Performed by: PODIATRIST

## 2025-05-01 PROCEDURE — 1159F MED LIST DOCD IN RCRD: CPT | Mod: CPTII,S$GLB,, | Performed by: PODIATRIST

## 2025-05-01 PROCEDURE — 3288F FALL RISK ASSESSMENT DOCD: CPT | Mod: CPTII,S$GLB,, | Performed by: PODIATRIST

## 2025-05-01 PROCEDURE — 1101F PT FALLS ASSESS-DOCD LE1/YR: CPT | Mod: CPTII,S$GLB,, | Performed by: PODIATRIST

## 2025-05-01 PROCEDURE — 99213 OFFICE O/P EST LOW 20 MIN: CPT | Mod: S$GLB,,, | Performed by: PODIATRIST

## 2025-05-01 PROCEDURE — 99999 PR PBB SHADOW E&M-EST. PATIENT-LVL III: CPT | Mod: PBBFAC,,, | Performed by: PODIATRIST

## 2025-05-01 PROCEDURE — 4010F ACE/ARB THERAPY RXD/TAKEN: CPT | Mod: CPTII,S$GLB,, | Performed by: PODIATRIST

## 2025-05-01 PROCEDURE — 3008F BODY MASS INDEX DOCD: CPT | Mod: CPTII,S$GLB,, | Performed by: PODIATRIST

## 2025-05-01 PROCEDURE — 1126F AMNT PAIN NOTED NONE PRSNT: CPT | Mod: CPTII,S$GLB,, | Performed by: PODIATRIST

## 2025-05-02 NOTE — PROGRESS NOTES
Subjective:     Patient ID: Rodrick Pearl is a 69 y.o. male.    Chief Complaint: Diabetes Mellitus (03/22/25 Dr Hager ) and Wound Check    Rodrick is a 69 y.o. male who presents to the clinic for evaluation and treatment of high risk feet. Rodrick has a past medical history of Diabetes mellitus and Hypertension. The patient's chief complaint is diabetic foot ulcer,B/L x several weeks. Presents with family. She thinks wheelchair may rub on patient's left foot causing a wound.   Also reports recent fall in bath tub injuring right foot toes 2,3,4.     4/10/2025 patient returns to clinic for follow up ulcers.  Previously seen by my colleague, new to me.  He relates that he has been attempting to care for the feet as instructed.  Since his last encounter he had BREEZY.  No new pedal complaints.  Denies nausea, vomiting, fever, chills.    4/17/2025 patient returns to clinic for follow up ulcers.  Previously seen by my colleague, new to me.  He relates that he has been attempting to care for the feet as instructed.  No new pedal complaints.  Denies nausea, vomiting, fever, chills.    4/24/25: F/u B/L ulcers. Reports applying iodosorb left foot daily.     5/1/25: F/u B/L ulceration. Presents in football dressing.     PCP: Edgar Hager MD    Date Last Seen by PCP: per above        Hemoglobin A1C   Date Value Ref Range Status   03/21/2025 6.8 (H) 4.0 - 5.6 % Final     Comment:     ADA Screening Guidelines:  5.7-6.4%  Consistent with prediabetes  >or=6.5%  Consistent with diabetes    High levels of fetal hemoglobin interfere with the HbA1C  assay. Heterozygous hemoglobin variants (HbS, HgC, etc)do  not significantly interfere with this assay.   However, presence of multiple variants may affect accuracy.     03/21/2025 6.7 (H) 4.0 - 5.6 % Final     Comment:     ADA Screening Guidelines:  5.7-6.4%  Consistent with prediabetes  >or=6.5%  Consistent with diabetes    High levels of fetal hemoglobin interfere with the HbA1C  assay.  "Heterozygous hemoglobin variants (HbS, HgC, etc)do  not significantly interfere with this assay.   However, presence of multiple variants may affect accuracy.     04/09/2024 5.6 4.7 - 5.6 % Final   01/10/2024 6.5 (H) <5.7 % Final   07/19/2023 7.3 (H) <5.7 % Final   04/05/2023 8.2 (A) 4.0 - 6.0 % Final         Problem List[1]    Medications Ordered Prior to Encounter[2]    Review of patient's allergies indicates:   Allergen Reactions    Netarsudil Other (See Comments), Itching and Swelling     To eyes       Past Surgical History:   Procedure Laterality Date    ENDOSCOPY OF PROXIMAL SMALL INTESTINE N/A 6/3/2024    Procedure: ENTEROSCOPY, PROXIMAL;  Surgeon: Kj Zee MD;  Location: [facility];  Service: Endoscopy;  Laterality: N/A;  upper SBE    FEMUR FRACTURE SURGERY Right     TOE AMPUTATION Left     Great toe    TOE AMPUTATION Right     little toe    TONSILLECTOMY      TOTAL KNEE ARTHROPLASTY Right        Family History   Problem Relation Name Age of Onset    Lung cancer Father      Kidney cancer Sister      Breast cancer Sister radha     Prostate cancer Paternal Uncle      Lung cancer Paternal Uncle      Pancreatic cancer Paternal Aunt         Social History[3]        Review of Systems   Constitutional: Negative for chills.   Cardiovascular:  Negative for chest pain and claudication.   Respiratory:  Negative for cough.    Skin:  Positive for color change, dry skin and nail changes.   Musculoskeletal:  Positive for joint pain.   Gastrointestinal:  Negative for nausea.   Neurological:  Positive for paresthesias. Negative for numbness.   Psychiatric/Behavioral:  The patient is not nervous/anxious.         Objective:     Vitals:    05/01/25 1357   Weight: 105.7 kg (233 lb 0.4 oz)   Height: 5' 9" (1.753 m)   PainSc: 0-No pain       Physical Exam  Constitutional:       Appearance: He is well-developed.      Comments: Oriented to time, place, and person.   Cardiovascular:      Comments: DP and PT pulses are " palpable bilaterally. 3 sec capillary refill time and toes and feet are warm to touch proximally .  There is  hair growth on the feet and toes b/l. There is no edema b/l. No spider veins or varicosities present b/l.     Musculoskeletal:      Comments: Equinus noted b/l ankles with < 10 deg DF noted. MMT 5/5 in DF/PF/Inv/Ev resistance with no reproduction of pain in any direction. Passive range of motion of ankle and pedal joints is painless b/l.     Feet:      Right foot:      Skin integrity: Ulcer and dry skin present. No callus.      Left foot:      Skin integrity: Ulcer and dry skin present. No callus.   Lymphadenopathy:      Comments: Negative lymphadenopathy bilateral popliteal fossa and tarsal tunnel.   Skin:     Comments: Hematoma right hallux    Left plantar lateral foot ulceration red granular base 0.1cmx0.1cmx0.1cm     Neurological:      Mental Status: He is alert.      Comments: Light touch, proprioception, and sharp/dull sensation are all intact bilaterally. Protective threshold with the [place]-Wienstein monofilament is intact bilaterally.    Psychiatric:         Behavior: Behavior is cooperative.       4/17/2025                        4/10/2025    Right         Plantar heels        Left              3/31/25:  Right foot.       Left foot       Narrative & Impression  EXAMINATION:  XR FOOT COMPLETE 3 VIEW BILATERAL     CLINICAL HISTORY:  wounds both feet; Non-pressure chronic ulcer of other part of left foot with unspecified severity     TECHNIQUE:  AP, lateral, and oblique views of both feet were performed.     COMPARISON:  None     FINDINGS:  Left: Prior partial resection change of the 5th distal metatarsal and great toe phalanges.  Cortical thickening with possible remote traumatic change or prior infection of the 2nd metatarsal.  Slight contour flattening at the metatarsal head with DJD change at the 2nd MTP.  Deformity with irregularity centered at the 2nd toe PIP, potentially on the basis of remote  or indolent infection.  Scattered foot arthritic changes and calcaneal spurring.  Vascular calcification.  No acute displaced fracture.     Right: Prior resection change of the 5th toe phalanges.  Scattered foot arthritic changes and calcaneal spurring.  Vascular calcification.  No acute displaced fracture or seth osseous destruction.     Impression:     As above.        Electronically signed by:Richmond Biswas  Date:                                            04/01/2025  Time:                                           08:30        Noncompressible bilateral lower extremity TBIs.    Noncompressible right lower extremity TBI.    Recommend lower extremity arterial ultrasound.      Assessment:      Encounter Diagnoses   Name Primary?    Ulcer of right foot, limited to breakdown of skin     Ulcer of left foot, unspecified ulcer stage     Type II diabetes mellitus with neurological manifestations Yes         Plan:     Rodrick was seen today for diabetes mellitus and wound check.    Diagnoses and all orders for this visit:    Type II diabetes mellitus with neurological manifestations    Ulcer of right foot, limited to breakdown of skin    Ulcer of left foot, unspecified ulcer stage          I counseled the patient on his conditions, their implications and medical management.      Debridement: With verbal consent, nonviable tissues on the left foot were debrided beyond sub q utilizing a  sterile No. 3 scalpel and forceps. Minimal bleeding controlled with direct pressure  The patient tolerated this well.     Dressings: iodosorb  Offloading:Foam    Follow-up:Patient is to return to the clinic in 1 week  for follow-up but should call [facility] immediately if any signs of infection, such as fever, chills, sweats, increased redness or pain.    Short-term goals include maintaining good offloading and minimizing bioburden, promoting granulation and epithelialization to healing.  Long-term goals include keeping the wound healed by  good offloading and medical management under the direction of internist.    - Shoe inspection. Diabetic Foot Education. Patient reminded of the importance of good nutrition and blood sugar control to help prevent podiatric complications of diabetes. Patient instructed on proper foot hygeine. We discussed wearing proper shoe gear, daily foot inspections, never walking without protective shoe gear, caution putting sharp instruments to feet     - Discussed DM foot care:  Wear comfortable, proper fitting shoes. Wash feet daily. Dry well. After drying, apply moisturizer to feet (no lotion to webspaces). Inspect feet daily for skin breaks, blisters, swelling, or redness. Wear cotton socks (preferably white)  Change socks every day. Do NOT walk barefoot. Do NOT use heating pads or warm/hot water soaks     Arterial study ordered for inconclusive BREEZY  [facility] appt scheduled for 5/12/25.  Dressings: iodosorb left foot betadine right foot  Offloading:Foam    Follow-up:Patient is to return to the clinic in 7-10 days for follow-up but should call [facility] immediately if any signs of infection, such as fever, chills, sweats, increased redness or pain.    Short-term goals include maintaining good offloading and minimizing bioburden, promoting granulation and epithelialization to healing.  Long-term goals include keeping the wound healed by good offloading and medical management under the direction of internist.                   [1]   Patient Active Problem List  Diagnosis    Prostate cancer metastatic to bone    HTN (hypertension)    Paroxysmal atrial fibrillation    Neurogenic bladder    Type 2 diabetes mellitus with circulatory disorder, with long-term current use of insulin    Type 2 diabetes mellitus with both eyes affected by moderate nonproliferative retinopathy and macular edema, with long-term current use of insulin    Peripheral vascular disease    Hypertensive chronic kidney disease with stage 5 chronic kidney disease or  end stage renal disease    Suprapubic catheter   [2]   Current Outpatient Medications on File Prior to Visit   Medication Sig Dispense Refill    amLODIPine (NORVASC) 10 MG tablet Take 1 tablet (10 mg total) by mouth once daily. 90 tablet 3    aspirin (ECOTRIN) 81 MG EC tablet Take 81 mg by mouth once daily.      atorvastatin (LIPITOR) 10 MG tablet Take 10 mg by mouth once daily.      brimonidine 0.1% (ALPHAGAN P) 0.1 % Drop Place 1 drop into both eyes 3 (three) times daily.      dorzolamide (TRUSOPT) 2 % ophthalmic solution 1 drop 3 (three) times daily.      doxazosin (CARDURA) 2 MG tablet Take 2 mg by mouth every evening.      doxycycline (VIBRAMYCIN) 100 MG Cap Take 1 capsule (100 mg total) by mouth every 12 (twelve) hours. 20 capsule 0    empagliflozin (JARDIANCE) 25 mg tablet Take 25 mg by mouth once daily.      ferrous sulfate (FEOSOL) Tab tablet Take 1 tablet by mouth daily with breakfast.      flash glucose sensor (FREESTYLE MARC 2 SENSOR) Kit 1 Device by Misc.(Non-Drug; Combo Route) route once daily. E11.59, Z79.4 1 kit 5    insulin aspart U-100 (NOVOLOG) 100 unit/mL injection Inject into the skin 3 (three) times daily before meals.      insulin degludec (TRESIBA FLEXTOUCH U-200) 200 unit/mL (3 mL) insulin pen Inject 46 Units into the skin every evening. 20 mL 3    metoprolol tartrate (LOPRESSOR) 50 MG tablet Take 1 tablet (50 mg total) by mouth 2 (two) times daily.      pantoprazole (PROTONIX) 40 MG tablet Take 40 mg by mouth once daily.      sodium bicarbonate 650 MG tablet Take 1 tablet by mouth every 8 (eight) hours.      XTANDI 40 mg Tab Take 2 tablets by mouth 2 (two) times daily.       No current facility-administered medications on file prior to visit.   [3]   Social History  Socioeconomic History    Marital status: Unknown   Tobacco Use    Smoking status: Never    Smokeless tobacco: Never   Substance and Sexual Activity    Alcohol use: Not Currently    Drug use: Never   Social History Narrative     Lives in [place] with his wife, Naomi. Also with son Kaz and daughter Althea. Previously worked as .      Social Drivers of Health     Financial Resource Strain: Low Risk  (4/18/2025)    Received from [facility]    Overall Financial Resource Strain (CARDIA)     Difficulty of Paying Living Expenses: Not very hard   Food Insecurity: Food Insecurity Present (4/18/2025)    Received from [facility]    Hunger Vital Sign     Worried About Running Out of Food in the Last Year: Never true     Ran Out of Food in the Last Year: Sometimes true   Transportation Needs: No Transportation Needs (4/18/2025)    Received from [facility]    PRAPARE - Transportation     Lack of Transportation (Medical): No     Lack of Transportation (Non-Medical): No   Physical Activity: Inactive (4/18/2025)    Received from [facility]    Exercise Vital Sign     Days of Exercise per Week: 0 days     Minutes of Exercise per Session: 0 min   Stress: No Stress Concern Present (4/18/2025)    Received from [facility]    [employer] - Occupational Stress Questionnaire     Feeling of Stress : Not at all   Housing Stability: Unknown (4/18/2025)    Received from [facility]    Housing Stability Vital Sign     Unable to Pay for Housing in the Last Year: No

## 2025-05-05 ENCOUNTER — PATIENT MESSAGE (OUTPATIENT)
Dept: ADMINISTRATIVE | Facility: HOSPITAL | Age: 70
End: 2025-05-05
Payer: MEDICARE

## 2025-05-08 ENCOUNTER — OFFICE VISIT (OUTPATIENT)
Dept: PODIATRY | Facility: CLINIC | Age: 70
End: 2025-05-08
Payer: MEDICARE

## 2025-05-08 VITALS — HEIGHT: 69 IN | WEIGHT: 233 LBS | BODY MASS INDEX: 34.51 KG/M2

## 2025-05-08 DIAGNOSIS — E11.49 TYPE II DIABETES MELLITUS WITH NEUROLOGICAL MANIFESTATIONS: Primary | ICD-10-CM

## 2025-05-08 DIAGNOSIS — L97.529 ULCER OF LEFT FOOT, UNSPECIFIED ULCER STAGE: ICD-10-CM

## 2025-05-08 DIAGNOSIS — L97.511 ULCER OF RIGHT FOOT, LIMITED TO BREAKDOWN OF SKIN: ICD-10-CM

## 2025-05-08 PROCEDURE — 4010F ACE/ARB THERAPY RXD/TAKEN: CPT | Mod: CPTII,S$GLB,, | Performed by: PODIATRIST

## 2025-05-08 PROCEDURE — 1101F PT FALLS ASSESS-DOCD LE1/YR: CPT | Mod: CPTII,S$GLB,, | Performed by: PODIATRIST

## 2025-05-08 PROCEDURE — 1159F MED LIST DOCD IN RCRD: CPT | Mod: CPTII,S$GLB,, | Performed by: PODIATRIST

## 2025-05-08 PROCEDURE — 3008F BODY MASS INDEX DOCD: CPT | Mod: CPTII,S$GLB,, | Performed by: PODIATRIST

## 2025-05-08 PROCEDURE — 99999 PR PBB SHADOW E&M-EST. PATIENT-LVL III: CPT | Mod: PBBFAC,,, | Performed by: PODIATRIST

## 2025-05-08 PROCEDURE — 3044F HG A1C LEVEL LT 7.0%: CPT | Mod: CPTII,S$GLB,, | Performed by: PODIATRIST

## 2025-05-08 PROCEDURE — 1126F AMNT PAIN NOTED NONE PRSNT: CPT | Mod: CPTII,S$GLB,, | Performed by: PODIATRIST

## 2025-05-08 PROCEDURE — 99213 OFFICE O/P EST LOW 20 MIN: CPT | Mod: S$GLB,,, | Performed by: PODIATRIST

## 2025-05-08 PROCEDURE — 3288F FALL RISK ASSESSMENT DOCD: CPT | Mod: CPTII,S$GLB,, | Performed by: PODIATRIST

## 2025-05-09 ENCOUNTER — TELEPHONE (OUTPATIENT)
Dept: WOUND CARE | Facility: HOSPITAL | Age: 70
End: 2025-05-09
Payer: MEDICARE

## 2025-05-09 NOTE — PROGRESS NOTES
Subjective:     Patient ID: Rodrick Pearl is a 70 y.o. male.    Chief Complaint: Diabetes Mellitus (3/21/25 Dr Hager) and Wound Check    Rodrick is a 70 y.o. male who presents to the clinic for evaluation and treatment of high risk feet. Rodrick has a past medical history of Diabetes mellitus and Hypertension. The patient's chief complaint is diabetic foot ulcer,B/L x several weeks. Presents with family. She thinks wheelchair may rub on patient's left foot causing a wound.   Also reports recent fall in bath tub injuring right foot toes 2,3,4.     4/10/2025 patient returns to clinic for follow up ulcers.  Previously seen by my colleague, new to me.  He relates that he has been attempting to care for the feet as instructed.  Since his last encounter he had BREEZY.  No new pedal complaints.  Denies nausea, vomiting, fever, chills.    4/17/2025 patient returns to clinic for follow up ulcers.  Previously seen by my colleague, new to me.  He relates that he has been attempting to care for the feet as instructed.  No new pedal complaints.  Denies nausea, vomiting, fever, chills.    4/24/25: F/u B/L ulcers. Reports applying iodosorb left foot daily.     5/1/25: F/u B/L ulceration. Presents in football dressing.     5/8/25: F/u B/L ulceration. Presents in football dressing.     PCP: Edgar Hager MD    Date Last Seen by PCP: per above        Hemoglobin A1C   Date Value Ref Range Status   03/21/2025 6.8 (H) 4.0 - 5.6 % Final     Comment:     ADA Screening Guidelines:  5.7-6.4%  Consistent with prediabetes  >or=6.5%  Consistent with diabetes    High levels of fetal hemoglobin interfere with the HbA1C  assay. Heterozygous hemoglobin variants (HbS, HgC, etc)do  not significantly interfere with this assay.   However, presence of multiple variants may affect accuracy.     03/21/2025 6.7 (H) 4.0 - 5.6 % Final     Comment:     ADA Screening Guidelines:  5.7-6.4%  Consistent with prediabetes  >or=6.5%  Consistent with diabetes    High  "levels of fetal hemoglobin interfere with the HbA1C  assay. Heterozygous hemoglobin variants (HbS, HgC, etc)do  not significantly interfere with this assay.   However, presence of multiple variants may affect accuracy.     04/09/2024 5.6 4.7 - 5.6 % Final   01/10/2024 6.5 (H) <5.7 % Final   07/19/2023 7.3 (H) <5.7 % Final   04/05/2023 8.2 (A) 4.0 - 6.0 % Final         Problem List[1]    Medications Ordered Prior to Encounter[2]    Review of patient's allergies indicates:   Allergen Reactions    Netarsudil Other (See Comments), Itching and Swelling     To eyes       Past Surgical History:   Procedure Laterality Date    ENDOSCOPY OF PROXIMAL SMALL INTESTINE N/A 6/3/2024    Procedure: ENTEROSCOPY, PROXIMAL;  Surgeon: Kj Zee MD;  Location: [facility];  Service: Endoscopy;  Laterality: N/A;  upper SBE    FEMUR FRACTURE SURGERY Right     TOE AMPUTATION Left     Great toe    TOE AMPUTATION Right     little toe    TONSILLECTOMY      TOTAL KNEE ARTHROPLASTY Right        Family History   Problem Relation Name Age of Onset    Lung cancer Father      Kidney cancer Sister      Breast cancer Sister radha     Prostate cancer Paternal Uncle      Lung cancer Paternal Uncle      Pancreatic cancer Paternal Aunt         Social History[3]        Review of Systems   Constitutional: Negative for chills.   Cardiovascular:  Negative for chest pain and claudication.   Respiratory:  Negative for cough.    Skin:  Positive for color change, dry skin and nail changes.   Musculoskeletal:  Positive for joint pain.   Gastrointestinal:  Negative for nausea.   Neurological:  Positive for paresthesias. Negative for numbness.   Psychiatric/Behavioral:  The patient is not nervous/anxious.         Objective:     Vitals:    05/08/25 1438   Weight: 105.7 kg (233 lb 0.4 oz)   Height: 5' 9" (1.753 m)   PainSc: 0-No pain       Physical Exam  Constitutional:       Appearance: He is well-developed.      Comments: Oriented to time, place, and person. "   Cardiovascular:      Comments: DP and PT pulses are palpable bilaterally. 3 sec capillary refill time and toes and feet are warm to touch proximally .  There is  hair growth on the feet and toes b/l. There is no edema b/l. No spider veins or varicosities present b/l.     Musculoskeletal:      Comments: Equinus noted b/l ankles with < 10 deg DF noted. MMT 5/5 in DF/PF/Inv/Ev resistance with no reproduction of pain in any direction. Passive range of motion of ankle and pedal joints is painless b/l.     Feet:      Right foot:      Skin integrity: Ulcer and dry skin present. No callus.      Left foot:      Skin integrity: Ulcer and dry skin present. No callus.   Lymphadenopathy:      Comments: Negative lymphadenopathy bilateral popliteal fossa and tarsal tunnel.   Skin:     Comments: Hematoma right hallux    Left plantar lateral foot ulceration red granular base 0.1cmx0.1cmx0.1cm     Neurological:      Mental Status: He is alert.      Comments: Light touch, proprioception, and sharp/dull sensation are all intact bilaterally. Protective threshold with the [place]-Wienstein monofilament is intact bilaterally.    Psychiatric:         Behavior: Behavior is cooperative.       B/L wounds healed with fragile epithelium    4/17/2025                        4/10/2025    Right         Plantar heels        Left              3/31/25:  Right foot.       Left foot       Narrative & Impression  EXAMINATION:  XR FOOT COMPLETE 3 VIEW BILATERAL     CLINICAL HISTORY:  wounds both feet; Non-pressure chronic ulcer of other part of left foot with unspecified severity     TECHNIQUE:  AP, lateral, and oblique views of both feet were performed.     COMPARISON:  None     FINDINGS:  Left: Prior partial resection change of the 5th distal metatarsal and great toe phalanges.  Cortical thickening with possible remote traumatic change or prior infection of the 2nd metatarsal.  Slight contour flattening at the metatarsal head with DJD change at the 2nd  MTP.  Deformity with irregularity centered at the 2nd toe PIP, potentially on the basis of remote or indolent infection.  Scattered foot arthritic changes and calcaneal spurring.  Vascular calcification.  No acute displaced fracture.     Right: Prior resection change of the 5th toe phalanges.  Scattered foot arthritic changes and calcaneal spurring.  Vascular calcification.  No acute displaced fracture or seth osseous destruction.     Impression:     As above.        Electronically signed by:Richmond Biswas  Date:                                            04/01/2025  Time:                                           08:30        Noncompressible bilateral lower extremity TBIs.    Noncompressible right lower extremity TBI.    Recommend lower extremity arterial ultrasound.      Assessment:      No diagnosis found.        Plan:     There are no diagnoses linked to this encounter.        I counseled the patient on his conditions, their implications and medical management.    B/L wounds healed with fragile epithelium    Dressings: betadine   Offloading:Foam   Instructed on daily betadine application.     Follow-up:Patient is to return to the clinic in 1 week  for follow-up but should call [facility] immediately if any signs of infection, such as fever, chills, sweats, increased redness or pain.    Short-term goals include maintaining good offloading and minimizing bioburden, promoting granulation and epithelialization to healing.  Long-term goals include keeping the wound healed by good offloading and medical management under the direction of internist.    - Shoe inspection. Diabetic Foot Education. Patient reminded of the importance of good nutrition and blood sugar control to help prevent podiatric complications of diabetes. Patient instructed on proper foot hygeine. We discussed wearing proper shoe gear, daily foot inspections, never walking without protective shoe gear, caution putting sharp instruments to feet     -  Discussed DM foot care:  Wear comfortable, proper fitting shoes. Wash feet daily. Dry well. After drying, apply moisturizer to feet (no lotion to webspaces). Inspect feet daily for skin breaks, blisters, swelling, or redness. Wear cotton socks (preferably white)  Change socks every day. Do NOT walk barefoot. Do NOT use heating pads or warm/hot water soaks       Follow-up:Patient is to return to the clinic in 2 weeks  for follow-up but should call [facility] immediately if any signs of infection, such as fever, chills, sweats, increased redness or pain.    Short-term goals include maintaining good offloading and minimizing bioburden, promoting granulation and epithelialization to healing.  Long-term goals include keeping the wound healed by good offloading and medical management under the direction of internist.                     [1]   Patient Active Problem List  Diagnosis    Prostate cancer metastatic to bone    HTN (hypertension)    Paroxysmal atrial fibrillation    Neurogenic bladder    Type 2 diabetes mellitus with circulatory disorder, with long-term current use of insulin    Type 2 diabetes mellitus with both eyes affected by moderate nonproliferative retinopathy and macular edema, with long-term current use of insulin    Peripheral vascular disease    Hypertensive chronic kidney disease with stage 5 chronic kidney disease or end stage renal disease    Suprapubic catheter   [2]   Current Outpatient Medications on File Prior to Visit   Medication Sig Dispense Refill    amLODIPine (NORVASC) 10 MG tablet Take 1 tablet (10 mg total) by mouth once daily. 90 tablet 3    aspirin (ECOTRIN) 81 MG EC tablet Take 81 mg by mouth once daily.      atorvastatin (LIPITOR) 10 MG tablet Take 10 mg by mouth once daily.      brimonidine 0.1% (ALPHAGAN P) 0.1 % Drop Place 1 drop into both eyes 3 (three) times daily.      dorzolamide (TRUSOPT) 2 % ophthalmic solution 1 drop 3 (three) times daily.      doxazosin (CARDURA) 2 MG tablet  Take 2 mg by mouth every evening.      doxycycline (VIBRAMYCIN) 100 MG Cap Take 1 capsule (100 mg total) by mouth every 12 (twelve) hours. 20 capsule 0    empagliflozin (JARDIANCE) 25 mg tablet Take 25 mg by mouth once daily.      ferrous sulfate (FEOSOL) Tab tablet Take 1 tablet by mouth daily with breakfast.      flash glucose sensor (FREESTYLE MARC 2 SENSOR) Kit 1 Device by Misc.(Non-Drug; Combo Route) route once daily. E11.59, Z79.4 1 kit 5    insulin aspart U-100 (NOVOLOG) 100 unit/mL injection Inject into the skin 3 (three) times daily before meals.      insulin degludec (TRESIBA FLEXTOUCH U-200) 200 unit/mL (3 mL) insulin pen Inject 46 Units into the skin every evening. 20 mL 3    metoprolol tartrate (LOPRESSOR) 50 MG tablet Take 1 tablet (50 mg total) by mouth 2 (two) times daily.      pantoprazole (PROTONIX) 40 MG tablet Take 40 mg by mouth once daily.      sodium bicarbonate 650 MG tablet Take 1 tablet by mouth every 8 (eight) hours.      XTANDI 40 mg Tab Take 2 tablets by mouth 2 (two) times daily.       No current facility-administered medications on file prior to visit.   [3]   Social History  Socioeconomic History    Marital status: Unknown   Tobacco Use    Smoking status: Never    Smokeless tobacco: Never   Substance and Sexual Activity    Alcohol use: Not Currently    Drug use: Never   Social History Narrative    Lives in [place] with his wife, Naomi. Also with son Kaz and daughter Althea. Previously worked as .      Social Drivers of Health     Financial Resource Strain: Low Risk  (4/18/2025)    Received from [facility]    Overall Financial Resource Strain (CARDIA)     Difficulty of Paying Living Expenses: Not very hard   Food Insecurity: Food Insecurity Present (4/18/2025)    Received from [facility]    Hunger Vital Sign     Worried About Running Out of Food in the Last Year: Never true     Ran Out of Food in the Last Year: Sometimes true   Transportation Needs: No  Transportation Needs (4/18/2025)    Received from [facility]    PRAPARE - Transportation     Lack of Transportation (Medical): No     Lack of Transportation (Non-Medical): No   Physical Activity: Inactive (4/18/2025)    Received from [facility]    Exercise Vital Sign     Days of Exercise per Week: 0 days     Minutes of Exercise per Session: 0 min   Stress: No Stress Concern Present (4/18/2025)    Received from [facility]    [employer] [place] [employer] - Occupational Stress Questionnaire     Feeling of Stress : Not at all   Housing Stability: Unknown (4/18/2025)    Received from [facility]    Housing Stability Vital Sign     Unable to Pay for Housing in the Last Year: No

## 2025-05-09 NOTE — TELEPHONE ENCOUNTER
Called pt to sched new pt appt  with wound care - pt states that he has healed out, advised sorry for the wait there were no available spots. Pt stated all is well call was ended .

## 2025-05-12 ENCOUNTER — OFFICE VISIT (OUTPATIENT)
Dept: CARDIOLOGY | Facility: CLINIC | Age: 70
End: 2025-05-12
Payer: MEDICARE

## 2025-05-12 VITALS
HEIGHT: 69 IN | RESPIRATION RATE: 18 BRPM | BODY MASS INDEX: 34.51 KG/M2 | DIASTOLIC BLOOD PRESSURE: 87 MMHG | OXYGEN SATURATION: 94 % | HEART RATE: 70 BPM | WEIGHT: 233 LBS | SYSTOLIC BLOOD PRESSURE: 164 MMHG

## 2025-05-12 DIAGNOSIS — L97.511 ULCER OF RIGHT FOOT, LIMITED TO BREAKDOWN OF SKIN: ICD-10-CM

## 2025-05-12 DIAGNOSIS — L97.529 ULCER OF LEFT FOOT, UNSPECIFIED ULCER STAGE: ICD-10-CM

## 2025-05-12 DIAGNOSIS — I70.0 AORTIC ATHEROSCLEROSIS: ICD-10-CM

## 2025-05-12 DIAGNOSIS — E11.51 TYPE II DIABETES MELLITUS WITH PERIPHERAL CIRCULATORY DISORDER: ICD-10-CM

## 2025-05-12 DIAGNOSIS — I10 HYPERTENSION, UNSPECIFIED TYPE: Primary | ICD-10-CM

## 2025-05-12 PROCEDURE — G2211 COMPLEX E/M VISIT ADD ON: HCPCS | Mod: S$GLB,,, | Performed by: INTERNAL MEDICINE

## 2025-05-12 PROCEDURE — 3008F BODY MASS INDEX DOCD: CPT | Mod: CPTII,S$GLB,, | Performed by: INTERNAL MEDICINE

## 2025-05-12 PROCEDURE — 4010F ACE/ARB THERAPY RXD/TAKEN: CPT | Mod: CPTII,S$GLB,, | Performed by: INTERNAL MEDICINE

## 2025-05-12 PROCEDURE — 3044F HG A1C LEVEL LT 7.0%: CPT | Mod: CPTII,S$GLB,, | Performed by: INTERNAL MEDICINE

## 2025-05-12 PROCEDURE — 3077F SYST BP >= 140 MM HG: CPT | Mod: CPTII,S$GLB,, | Performed by: INTERNAL MEDICINE

## 2025-05-12 PROCEDURE — 1159F MED LIST DOCD IN RCRD: CPT | Mod: CPTII,S$GLB,, | Performed by: INTERNAL MEDICINE

## 2025-05-12 PROCEDURE — 3079F DIAST BP 80-89 MM HG: CPT | Mod: CPTII,S$GLB,, | Performed by: INTERNAL MEDICINE

## 2025-05-12 PROCEDURE — 99999 PR PBB SHADOW E&M-EST. PATIENT-LVL V: CPT | Mod: PBBFAC,,, | Performed by: INTERNAL MEDICINE

## 2025-05-12 PROCEDURE — 1126F AMNT PAIN NOTED NONE PRSNT: CPT | Mod: CPTII,S$GLB,, | Performed by: INTERNAL MEDICINE

## 2025-05-12 PROCEDURE — 99204 OFFICE O/P NEW MOD 45 MIN: CPT | Mod: S$GLB,,, | Performed by: INTERNAL MEDICINE

## 2025-05-12 PROCEDURE — 1101F PT FALLS ASSESS-DOCD LE1/YR: CPT | Mod: CPTII,S$GLB,, | Performed by: INTERNAL MEDICINE

## 2025-05-12 PROCEDURE — 3288F FALL RISK ASSESSMENT DOCD: CPT | Mod: CPTII,S$GLB,, | Performed by: INTERNAL MEDICINE

## 2025-05-12 NOTE — PROGRESS NOTES
CARDIOVASCULAR CONSULTATION    REASON FOR CONSULT:   Rodrick Pearl is a 70 y.o. male who presents for   Chief Complaint   Patient presents with    Consult        Referred by:  Barbara Montes Dpm  [id number] [place] [address]    Ordered on: 5/12/2025   Associated Dx: Ulcer of left foot, unspecified ulcer stage; Ulcer of right foot, limited to breakdown of skin; Type II diabetes mellitus with peripheral circulatory disorder   Authorizing provider: Barbara Montes DPM       HISTORY OF PRESENT ILLNESS:     History of Present Illness    CHIEF COMPLAINT:  Rodrick presents today for evaluation of foot wounds and circulation    FOOT WOUNDS:  He has a history of wounds on RLE toe and LLE which have now healed.    CARDIOVASCULAR HISTORY:  He has a history of PAF during episodes of pneumonia and COVID-19. He denies history of cardiac infarction or stroke. He follows with cardiologist Dr. Raman at [facility] he has issues. Recent echo was performed in April 2025. US of the legs showed atherosclerosis in the leg arteries without significant stenosis.    MEDICATIONS:  His current medications include amlodipine, ASA 81 mg, atorvastatin, and metoprolol. He is on ASA as his only anticoagulant.         No results found for this or any previous visit.      4/3/25 ABIs:  Noncompressible bilateral lower extremity TBIs.   Noncompressible right lower extremity TBI.   Recommend lower extremity arterial ultrasound.     4/14/25 LE Arterial:  Bilateral lower extremity peripheral arterial disease without hemodynamically significant stenosis.     4/23/25 Echo:  Normal left ventricular systolic function.   Left ventricular ejection fraction is estimated at 60%.   Grade I diastolic dysfunction (abnormal relaxation filling pattern), normal to mildly elevated filling pressures.   Normal right ventricular size and systolic function, RVSP 20 mmHg.   Mild mitral valve regurgitation.       July 19, 2024:   Two-dimensional echo Doppler showing  a left ventricular diastolic diameter 51 mm systolic diameter of 35 mm ejection fraction calculated at 77%. Study read as ejection fraction of 80% normal diastolic function hyperdynamic function right ventricular systolic function normal normal right ventricular size. No significant valvular problems. Normal structural a pulmonic mitral tricuspid valves. No effusion.     8/16/22 Holter:  The patient was monitored for 24 hours.   Maximum heart rate 103 bpm  Minimum heart rate 57 bpm   Average heart rate 71 bpm  Sinus rhythm  PVCs - 15  SVEs - 88 with four episodes of SVT - longest 4 beats, fastest 121 bpm.  Ischemia: Negative  Arrhythmias: Negative with SVT.    7/20/2021 Echo- CONCLUSIONS   Normal left ventricular cavity size. Normal left ventricular systolic function.   Left ventricular ejection fraction is estimated at 55-60%. Normal diastolic function.   There were no regional wall motion abnormalities.   Right ventricular systolic pressure 28.6 mmHg. Normal right ventricular size and systolic function.   Normal cardiac valves. Trace to mild MR and TR. Mild pulmonary valve regurgitation.   No pericardial effusion. Normal size aortic root and proximal ascending aorta.            Impression:     Bilateral lower extremity peripheral arterial disease without hemodynamically significant stenosis.    PAST MEDICAL HISTORY:     Past Medical History:   Diagnosis Date    Diabetes mellitus     Hypertension        PAST SURGICAL HISTORY:     Past Surgical History:   Procedure Laterality Date    ENDOSCOPY OF PROXIMAL SMALL INTESTINE N/A 6/3/2024    Procedure: ENTEROSCOPY, PROXIMAL;  Surgeon: Kj Zee MD;  Location: [facility];  Service: Endoscopy;  Laterality: N/A;  upper SBE    FEMUR FRACTURE SURGERY Right     TOE AMPUTATION Left     Great toe    TOE AMPUTATION Right     little toe    TONSILLECTOMY      TOTAL KNEE ARTHROPLASTY Right            SOCIAL HISTORY:   Social History[1]    FAMILY HISTORY:     Family History  "  Problem Relation Name Age of Onset    Lung cancer Father      Kidney cancer Sister      Breast cancer Sister radha     Prostate cancer Paternal Uncle      Lung cancer Paternal Uncle      Pancreatic cancer Paternal Aunt         REVIEW OF SYSTEMS:   Review of Systems   Constitutional: Negative.   HENT: Negative.     Eyes: Negative.    Respiratory: Negative.     Endocrine: Negative.    Hematologic/Lymphatic: Negative.    Skin: Negative.    Musculoskeletal: Negative.    Gastrointestinal: Negative.    Genitourinary: Negative.    Neurological: Negative.    Psychiatric/Behavioral: Negative.     Allergic/Immunologic: Negative.        A 10 point review of systems was performed and all the pertinent positives have been mentioned. Rest of review of systems was negative.        PHYSICAL EXAM:     Vitals:    05/12/25 1118   BP: (!) 164/87   Pulse: 70   Resp: 18    Body mass index is 34.41 kg/m².  Weight: 105.7 kg (233 lb 0.4 oz)   Height: 5' 9" (175.3 cm)     Physical Exam  Vitals reviewed.   Constitutional:       Appearance: He is well-developed.   HENT:      Head: Normocephalic.   Eyes:      Conjunctiva/sclera: Conjunctivae normal.      Pupils: Pupils are equal, round, and reactive to light.   Cardiovascular:      Rate and Rhythm: Normal rate and regular rhythm.      Heart sounds: Normal heart sounds.   Pulmonary:      Effort: Pulmonary effort is normal.      Breath sounds: Normal breath sounds.   Abdominal:      General: Bowel sounds are normal.      Palpations: Abdomen is soft.   Musculoskeletal:      Cervical back: Normal range of motion and neck supple.   Skin:     General: Skin is warm.   Neurological:      Mental Status: He is alert and oriented to person, place, and time.         Physical Exam              DATA:     Laboratory:  CBC:  Recent Labs   Lab 05/22/24  1002 06/03/24  0823 08/10/24  1643 03/21/25  1003   WBC 6.30 6.30  --  7.66   Hemoglobin 6.9 L 7.1 L 9.1 L 9.5 L   Hematocrit 22.1 L 23.5 L 28.8 L 30.8 L "   Platelets 282 180  --  190       CHEMISTRIES:  Recent Labs   Lab 02/21/24  1033 04/09/24  1130 01/29/25  1314 03/21/25  1003 04/08/25  0838   Glucose 145 H  --   --  94  --    Sodium 137   < > 137 141 139   Potassium 5.8 H   < > 5.1 4.7 4.2   BUN 38 H   < > 44.6 H 46 H 40.0 H   Creatinine 1.6 H   < > 1.37 1.4 1.20   Calcium 8.9   < > 9.0 8.3 L 8.8    < > = values in this interval not displayed.       CARDIAC BIOMARKERS:  Recent Labs   Lab 06/26/24 2053   CK 33       COAGS:  Recent Labs   Lab 10/14/22  0834 02/21/24  1033 06/26/24 2049   INR 0.9 1.1 1.2       LIPIDS/LFTS:  Recent Labs   Lab 04/09/24  1130 06/14/24  1507 01/29/25  1314 03/21/25  1003 04/08/25  0838   Cholesterol 111  --   --  140  --    Triglycerides 123  --   --  130  --    HDL 22 L  --   --  32 L  --    LDL Cholesterol  --   --   --  82.0  --    LDL Calculated 64  --   --   --   --    Non-HDL Cholesterol 89  --   --  108  --    AST 14   < > 13 18 13   ALT 11   < > 10 17 10    < > = values in this interval not displayed.       Hemoglobin A1C   Date Value Ref Range Status   03/21/2025 6.8 (H) 4.0 - 5.6 % Final     Comment:     ADA Screening Guidelines:  5.7-6.4%  Consistent with prediabetes  >or=6.5%  Consistent with diabetes    High levels of fetal hemoglobin interfere with the HbA1C  assay. Heterozygous hemoglobin variants (HbS, HgC, etc)do  not significantly interfere with this assay.   However, presence of multiple variants may affect accuracy.     03/21/2025 6.7 (H) 4.0 - 5.6 % Final     Comment:     ADA Screening Guidelines:  5.7-6.4%  Consistent with prediabetes  >or=6.5%  Consistent with diabetes    High levels of fetal hemoglobin interfere with the HbA1C  assay. Heterozygous hemoglobin variants (HbS, HgC, etc)do  not significantly interfere with this assay.   However, presence of multiple variants may affect accuracy.     04/09/2024 5.6 4.7 - 5.6 % Final   01/10/2024 6.5 (H) <5.7 % Final   07/19/2023 7.3 (H) <5.7 % Final   04/05/2023 8.2  "(A) 4.0 - 6.0 % Final       TSH        The 10-year ASCVD risk score (Taco WILSON, et al., 2019) is: 48.7%    Values used to calculate the score:      Age: 70 years      Sex: Male      Is Non- : No      Diabetic: Yes      Tobacco smoker: No      Systolic Blood Pressure: 164 mmHg      Is BP treated: Yes      HDL Cholesterol: 32 mg/dL      Total Cholesterol: 140 mg/dL       BNP    No results found for: "BNP"      ASSESSMENT AND PLAN     Problem List[2]        ALLERGIES AND MEDICATION:     Review of patient's allergies indicates:   Allergen Reactions    Netarsudil Other (See Comments), Itching and Swelling     To eyes        Medication List            Accurate as of May 12, 2025  3:02 PM. If you have any questions, ask your nurse or doctor.                CONTINUE taking these medications      amLODIPine 10 MG tablet  Commonly known as: NORVASC  Take 1 tablet (10 mg total) by mouth once daily.     aspirin 81 MG EC tablet  Commonly known as: ECOTRIN     atorvastatin 10 MG tablet  Commonly known as: LIPITOR     brimonidine 0.1% 0.1 % Drop  Commonly known as: ALPHAGAN P     dorzolamide 2 % ophthalmic solution  Commonly known as: TRUSOPT     doxazosin 2 MG tablet  Commonly known as: CARDURA     doxycycline 100 MG Cap  Commonly known as: VIBRAMYCIN  Take 1 capsule (100 mg total) by mouth every 12 (twelve) hours.     ferrous sulfate Tab tablet  Commonly known as: FEOSOL     FREESTYLE MARC 2 SENSOR Kit  Generic drug: flash glucose sensor  1 Device by Misc.(Non-Drug; Combo Route) route once daily. E11.59, Z79.4     insulin aspart U-100 100 unit/mL injection  Commonly known as: NovoLOG     insulin degludec 200 unit/mL (3 mL) insulin pen  Commonly known as: TRESIBA FLEXTOUCH U-200  Inject 46 Units into the skin every evening.     JARDIANCE 25 mg tablet  Generic drug: empagliflozin     metoprolol tartrate 50 MG tablet  Commonly known as: LOPRESSOR  Take 1 tablet (50 mg total) by mouth 2 (two) times daily.   "   pantoprazole 40 MG tablet  Commonly known as: PROTONIX     sodium bicarbonate 650 MG tablet     XTANDI 40 mg Tab  Generic drug: enzalutamide              No orders of the defined types were placed in this encounter.      Assessment & Plan    IMPRESSION:  US legs showed atherosclerosis in peripheral artery, but no significant stenosis requiring intervention.  Recent EKG did not show current AFib.  Deferred management of AFib and potential anticoagulants to existing cardiologist, Dr. Raman.  Discussed importance of risk factor modification for cardiovascular health.    PLAN SUMMARY:  - Continue current diet and exercise regimen  - Recommend following a plant-based diet  - Contact office immediately if wounds reopen  - Follow up in 6 months    ATHEROSCLEROSIS   Peripheral ultrasound did not reveal any flow-limiting stenosis.  His wounds have healed up.  Continue to monitor.  If wounds reappear, will need peripheral angiogram for further evaluation    FOLLOW-UP:  - Follow up in 6 months.           Thank you very much for involving me in the care of your patient.  Please do not hesitate to contact me if there are any questions.      Megan Butterfield MD, FACC, [facility]  Interventional Cardiologist, [facility].           This note was dictated with the help of speech recognition software.  There might be un-intended errors and/or substitutions.    This note was generated with the assistance of ambient listening technology. Verbal consent was obtained by the patient and accompanying visitor(s) for the recording of patient appointment to facilitate this note. I attest to having reviewed and edited the generated note for accuracy, though some syntax or spelling errors may persist. Please contact the author of this note for any clarification.       Visit today included increased complexity associated with the care of the episodic problem hypertension, peripheral artery disease, peripheral wounds addressed and managing  the longitudinal care of the patient due to the serious and/or complex managed problem(s) Problem List[3]  .           [1]   Social History  Socioeconomic History    Marital status: Unknown   Tobacco Use    Smoking status: Never    Smokeless tobacco: Never   Substance and Sexual Activity    Alcohol use: Not Currently    Drug use: Never   Social History Narrative    Lives in [place] with his wife, Naomi. Also with son Kaz and daughter Althea. Previously worked as .      Social Drivers of Health     Financial Resource Strain: Low Risk  (4/18/2025)    Received from [facility]    Overall Financial Resource Strain (CARDIA)     Difficulty of Paying Living Expenses: Not very hard   Food Insecurity: Food Insecurity Present (4/18/2025)    Received from [facility]    Hunger Vital Sign     Worried About Running Out of Food in the Last Year: Never true     Ran Out of Food in the Last Year: Sometimes true   Transportation Needs: No Transportation Needs (4/18/2025)    Received from [facility]    PRAPARE - Transportation     Lack of Transportation (Medical): No     Lack of Transportation (Non-Medical): No   Physical Activity: Inactive (4/18/2025)    Received from [facility]    Exercise Vital Sign     Days of Exercise per Week: 0 days     Minutes of Exercise per Session: 0 min   Stress: No Stress Concern Present (4/18/2025)    Received from [facility]    [place] [employer] - Occupational Stress Questionnaire     Feeling of Stress : Not at all   Housing Stability: Unknown (4/18/2025)    Received from [facility]    Housing Stability Vital Sign     Unable to Pay for Housing in the Last Year: No   [2]   Patient Active Problem List  Diagnosis    Prostate cancer metastatic to bone    HTN (hypertension)    Paroxysmal atrial fibrillation    Neurogenic bladder    Type 2 diabetes mellitus with circulatory disorder, with long-term current use of insulin    Type 2 diabetes mellitus with both  eyes affected by moderate nonproliferative retinopathy and macular edema, with long-term current use of insulin    Peripheral vascular disease    Hypertensive chronic kidney disease with stage 5 chronic kidney disease or end stage renal disease    Suprapubic catheter   [3]   Patient Active Problem List  Diagnosis    Prostate cancer metastatic to bone    HTN (hypertension)    Paroxysmal atrial fibrillation    Neurogenic bladder    Type 2 diabetes mellitus with circulatory disorder, with long-term current use of insulin    Type 2 diabetes mellitus with both eyes affected by moderate nonproliferative retinopathy and macular edema, with long-term current use of insulin    Peripheral vascular disease    Hypertensive chronic kidney disease with stage 5 chronic kidney disease or end stage renal disease    Suprapubic catheter

## 2025-05-22 ENCOUNTER — OFFICE VISIT (OUTPATIENT)
Dept: PODIATRY | Facility: CLINIC | Age: 70
End: 2025-05-22
Payer: MEDICARE

## 2025-05-22 VITALS — BODY MASS INDEX: 34.51 KG/M2 | WEIGHT: 233 LBS | HEIGHT: 69 IN

## 2025-05-22 DIAGNOSIS — E11.49 TYPE II DIABETES MELLITUS WITH NEUROLOGICAL MANIFESTATIONS: Primary | ICD-10-CM

## 2025-05-22 DIAGNOSIS — L97.529 ULCER OF LEFT FOOT, UNSPECIFIED ULCER STAGE: ICD-10-CM

## 2025-05-22 PROCEDURE — 99999 PR PBB SHADOW E&M-EST. PATIENT-LVL IV: CPT | Mod: PBBFAC,,, | Performed by: PODIATRIST

## 2025-05-22 PROCEDURE — 3288F FALL RISK ASSESSMENT DOCD: CPT | Mod: CPTII,S$GLB,, | Performed by: PODIATRIST

## 2025-05-22 PROCEDURE — 1126F AMNT PAIN NOTED NONE PRSNT: CPT | Mod: CPTII,S$GLB,, | Performed by: PODIATRIST

## 2025-05-22 PROCEDURE — 4010F ACE/ARB THERAPY RXD/TAKEN: CPT | Mod: CPTII,S$GLB,, | Performed by: PODIATRIST

## 2025-05-22 PROCEDURE — 1159F MED LIST DOCD IN RCRD: CPT | Mod: CPTII,S$GLB,, | Performed by: PODIATRIST

## 2025-05-22 PROCEDURE — 3008F BODY MASS INDEX DOCD: CPT | Mod: CPTII,S$GLB,, | Performed by: PODIATRIST

## 2025-05-22 PROCEDURE — 99214 OFFICE O/P EST MOD 30 MIN: CPT | Mod: S$GLB,,, | Performed by: PODIATRIST

## 2025-05-22 PROCEDURE — 1101F PT FALLS ASSESS-DOCD LE1/YR: CPT | Mod: CPTII,S$GLB,, | Performed by: PODIATRIST

## 2025-05-22 PROCEDURE — 3044F HG A1C LEVEL LT 7.0%: CPT | Mod: CPTII,S$GLB,, | Performed by: PODIATRIST

## 2025-05-23 RX ORDER — DOXYCYCLINE 100 MG/1
100 CAPSULE ORAL EVERY 12 HOURS
Qty: 20 CAPSULE | Refills: 0 | Status: SHIPPED | OUTPATIENT
Start: 2025-05-23

## 2025-05-23 NOTE — PROGRESS NOTES
Subjective:     Patient ID: Rodrick Pearl is a 70 y.o. male.    Chief Complaint: Diabetes Mellitus (3/21/25 Dr Hager) and Wound Check    Rodrick is a 70 y.o. male who presents to the clinic for evaluation and treatment of high risk feet. Rodrick has a past medical history of Diabetes mellitus and Hypertension. The patient's chief complaint is diabetic foot ulcer,B/L x several weeks. Presents with family. She thinks wheelchair may rub on patient's left foot causing a wound.   Also reports recent fall in bath tub injuring right foot toes 2,3,4.     4/10/2025 patient returns to clinic for follow up ulcers.  Previously seen by my colleague, new to me.  He relates that he has been attempting to care for the feet as instructed.  Since his last encounter he had BREEZY.  No new pedal complaints.  Denies nausea, vomiting, fever, chills.    4/17/2025 patient returns to clinic for follow up ulcers.  Previously seen by my colleague, new to me.  He relates that he has been attempting to care for the feet as instructed.  No new pedal complaints.  Denies nausea, vomiting, fever, chills.    4/24/25: F/u B/L ulcers. Reports applying iodosorb left foot daily.     5/1/25: F/u B/L ulceration. Presents in football dressing.     5/8/25: F/u B/L ulceration. Presents in football dressing.     5/22/25: New onset left 5th toe ulceration. Patient reports he thinks this occurred due to increased pressure to area during transfer to shower.   F/u left lateral foot ulceration.     PCP: Edgar Hager MD    Date Last Seen by PCP: per above        Hemoglobin A1C   Date Value Ref Range Status   03/21/2025 6.8 (H) 4.0 - 5.6 % Final     Comment:     ADA Screening Guidelines:  5.7-6.4%  Consistent with prediabetes  >or=6.5%  Consistent with diabetes    High levels of fetal hemoglobin interfere with the HbA1C  assay. Heterozygous hemoglobin variants (HbS, HgC, etc)do  not significantly interfere with this assay.   However, presence of multiple variants may  affect accuracy.     03/21/2025 6.7 (H) 4.0 - 5.6 % Final     Comment:     ADA Screening Guidelines:  5.7-6.4%  Consistent with prediabetes  >or=6.5%  Consistent with diabetes    High levels of fetal hemoglobin interfere with the HbA1C  assay. Heterozygous hemoglobin variants (HbS, HgC, etc)do  not significantly interfere with this assay.   However, presence of multiple variants may affect accuracy.     04/09/2024 5.6 4.7 - 5.6 % Final   01/10/2024 6.5 (H) <5.7 % Final   07/19/2023 7.3 (H) <5.7 % Final   04/05/2023 8.2 (A) 4.0 - 6.0 % Final         Problem List[1]    Medications Ordered Prior to Encounter[2]    Review of patient's allergies indicates:   Allergen Reactions    Netarsudil Other (See Comments), Itching and Swelling     To eyes       Past Surgical History:   Procedure Laterality Date    ENDOSCOPY OF PROXIMAL SMALL INTESTINE N/A 6/3/2024    Procedure: ENTEROSCOPY, PROXIMAL;  Surgeon: Kj Zee MD;  Location: [facility];  Service: Endoscopy;  Laterality: N/A;  upper SBE    FEMUR FRACTURE SURGERY Right     TOE AMPUTATION Left     Great toe    TOE AMPUTATION Right     little toe    TONSILLECTOMY      TOTAL KNEE ARTHROPLASTY Right        Family History   Problem Relation Name Age of Onset    Lung cancer Father      Kidney cancer Sister      Breast cancer Sister radha     Prostate cancer Paternal Uncle      Lung cancer Paternal Uncle      Pancreatic cancer Paternal Aunt         Social History[3]        Review of Systems   Constitutional: Negative for chills.   Cardiovascular:  Negative for chest pain and claudication.   Respiratory:  Negative for cough.    Skin:  Positive for color change, dry skin and nail changes.   Musculoskeletal:  Positive for joint pain.   Gastrointestinal:  Negative for nausea.   Neurological:  Positive for paresthesias. Negative for numbness.   Psychiatric/Behavioral:  The patient is not nervous/anxious.         Objective:     Vitals:    05/22/25 1552   Weight: 105.7 kg (233  "lb 0.4 oz)   Height: 5' 9" (1.753 m)   PainSc: 0-No pain       Physical Exam  Constitutional:       Appearance: He is well-developed.      Comments: Oriented to time, place, and person.   Cardiovascular:      Comments: DP and PT pulses are palpable bilaterally. 3 sec capillary refill time and toes and feet are warm to touch proximally .  There is  hair growth on the feet and toes b/l. There is no edema b/l. No spider veins or varicosities present b/l.     Musculoskeletal:      Comments: Equinus noted b/l ankles with < 10 deg DF noted. MMT 5/5 in DF/PF/Inv/Ev resistance with no reproduction of pain in any direction. Passive range of motion of ankle and pedal joints is painless b/l.     Feet:      Right foot:      Skin integrity: Ulcer and dry skin present. No callus.      Left foot:      Skin integrity: Ulcer and dry skin present. No callus.   Lymphadenopathy:      Comments: Negative lymphadenopathy bilateral popliteal fossa and tarsal tunnel.   Skin:     Comments: Hematoma right hallux    Left plantar lateral foot ulceration red granular base 0.2cmx0.2cmx0.1cm    Left 5th toe ulceration stable    Neurological:      Mental Status: He is alert.      Comments: Light touch, proprioception, and sharp/dull sensation are all intact bilaterally. Protective threshold with the [place]ienstein monofilament is intact bilaterally.    Psychiatric:         Behavior: Behavior is cooperative.           5/22/25:    Left 5th toe.     Left lateral foot       Right hallux       4/17/2025                        4/10/2025    Right         Plantar heels        Left              3/31/25:  Right foot.       Left foot       Narrative & Impression  EXAMINATION:  XR FOOT COMPLETE 3 VIEW BILATERAL     CLINICAL HISTORY:  wounds both feet; Non-pressure chronic ulcer of other part of left foot with unspecified severity     TECHNIQUE:  AP, lateral, and oblique views of both feet were performed.     COMPARISON:  None     FINDINGS:  Left: Prior " partial resection change of the 5th distal metatarsal and great toe phalanges.  Cortical thickening with possible remote traumatic change or prior infection of the 2nd metatarsal.  Slight contour flattening at the metatarsal head with DJD change at the 2nd MTP.  Deformity with irregularity centered at the 2nd toe PIP, potentially on the basis of remote or indolent infection.  Scattered foot arthritic changes and calcaneal spurring.  Vascular calcification.  No acute displaced fracture.     Right: Prior resection change of the 5th toe phalanges.  Scattered foot arthritic changes and calcaneal spurring.  Vascular calcification.  No acute displaced fracture or seth osseous destruction.     Impression:     As above.        Electronically signed by:Richmond Biswas  Date:                                            04/01/2025  Time:                                           08:30        Noncompressible bilateral lower extremity TBIs.    Noncompressible right lower extremity TBI.    Recommend lower extremity arterial ultrasound.      Assessment:      Encounter Diagnoses   Name Primary?    Type II diabetes mellitus with neurological manifestations Yes    Ulcer of left foot, unspecified ulcer stage            Plan:     Rodrick was seen today for diabetes mellitus and wound check.    Diagnoses and all orders for this visit:    Type II diabetes mellitus with neurological manifestations    Ulcer of left foot, unspecified ulcer stage            I counseled the patient on his conditions, their implications and medical management.        Dressings: iodosorb  Offloading:Foam, football left foot    Instructed on daily betadine application right hallux   Rx. Doxycyline   Follow-up:Patient is to return to the clinic in 1 week  for follow-up but should call [facility] immediately if any signs of infection, such as fever, chills, sweats, increased redness or pain.    Short-term goals include maintaining good offloading and minimizing  bioburden, promoting granulation and epithelialization to healing.  Long-term goals include keeping the wound healed by good offloading and medical management under the direction of internist.    - Shoe inspection. Diabetic Foot Education. Patient reminded of the importance of good nutrition and blood sugar control to help prevent podiatric complications of diabetes. Patient instructed on proper foot hygeine. We discussed wearing proper shoe gear, daily foot inspections, never walking without protective shoe gear, caution putting sharp instruments to feet     - Discussed DM foot care:  Wear comfortable, proper fitting shoes. Wash feet daily. Dry well. After drying, apply moisturizer to feet (no lotion to webspaces). Inspect feet daily for skin breaks, blisters, swelling, or redness. Wear cotton socks (preferably white)  Change socks every day. Do NOT walk barefoot. Do NOT use heating pads or warm/hot water soaks       Follow-up:Patient is to return to the clinic in 2 weeks  for follow-up but should call [facility] immediately if any signs of infection, such as fever, chills, sweats, increased redness or pain.    Short-term goals include maintaining good offloading and minimizing bioburden, promoting granulation and epithelialization to healing.  Long-term goals include keeping the wound healed by good offloading and medical management under the direction of internist.                       [1]   Patient Active Problem List  Diagnosis    Prostate cancer metastatic to bone    HTN (hypertension)    Paroxysmal atrial fibrillation    Neurogenic bladder    Type 2 diabetes mellitus with circulatory disorder, with long-term current use of insulin    Type 2 diabetes mellitus with both eyes affected by moderate nonproliferative retinopathy and macular edema, with long-term current use of insulin    Peripheral vascular disease    Hypertensive chronic kidney disease with stage 5 chronic kidney disease or end stage renal disease     Suprapubic catheter    Aortic atherosclerosis   [2]   Current Outpatient Medications on File Prior to Visit   Medication Sig Dispense Refill    amLODIPine (NORVASC) 10 MG tablet Take 1 tablet (10 mg total) by mouth once daily. 90 tablet 3    aspirin (ECOTRIN) 81 MG EC tablet Take 81 mg by mouth once daily.      atorvastatin (LIPITOR) 10 MG tablet Take 10 mg by mouth once daily.      brimonidine 0.1% (ALPHAGAN P) 0.1 % Drop Place 1 drop into both eyes 3 (three) times daily.      dorzolamide (TRUSOPT) 2 % ophthalmic solution 1 drop 3 (three) times daily.      doxazosin (CARDURA) 2 MG tablet Take 2 mg by mouth every evening.      doxycycline (VIBRAMYCIN) 100 MG Cap Take 1 capsule (100 mg total) by mouth every 12 (twelve) hours. 20 capsule 0    empagliflozin (JARDIANCE) 25 mg tablet Take 25 mg by mouth once daily.      ferrous sulfate (FEOSOL) Tab tablet Take 1 tablet by mouth daily with breakfast.      flash glucose sensor (FREESTYLE MARC 2 SENSOR) Kit 1 Device by Misc.(Non-Drug; Combo Route) route once daily. E11.59, Z79.4 1 kit 5    insulin aspart U-100 (NOVOLOG) 100 unit/mL injection Inject into the skin 3 (three) times daily before meals.      insulin degludec (TRESIBA FLEXTOUCH U-200) 200 unit/mL (3 mL) insulin pen Inject 46 Units into the skin every evening. 20 mL 3    metoprolol tartrate (LOPRESSOR) 50 MG tablet Take 1 tablet (50 mg total) by mouth 2 (two) times daily.      pantoprazole (PROTONIX) 40 MG tablet Take 40 mg by mouth once daily.      sodium bicarbonate 650 MG tablet Take 1 tablet by mouth every 8 (eight) hours.      XTANDI 40 mg Tab Take 2 tablets by mouth 2 (two) times daily.       No current facility-administered medications on file prior to visit.   [3]   Social History  Socioeconomic History    Marital status: Unknown   Tobacco Use    Smoking status: Never    Smokeless tobacco: Never   Substance and Sexual Activity    Alcohol use: Not Currently    Drug use: Never   Social History Narrative     Lives in [place] with his wife, Naomi. Also with son Kaz and daughter Althea. Previously worked as .      Social Drivers of Health     Financial Resource Strain: Low Risk  (4/18/2025)    Received from [facility]    Overall Financial Resource Strain (CARDIA)     Difficulty of Paying Living Expenses: Not very hard   Food Insecurity: Food Insecurity Present (4/18/2025)    Received from [facility]    Hunger Vital Sign     Worried About Running Out of Food in the Last Year: Never true     Ran Out of Food in the Last Year: Sometimes true   Transportation Needs: No Transportation Needs (4/18/2025)    Received from [facility]    PRAPARE - Transportation     Lack of Transportation (Medical): No     Lack of Transportation (Non-Medical): No   Physical Activity: Inactive (4/18/2025)    Received from [facility]    Exercise Vital Sign     Days of Exercise per Week: 0 days     Minutes of Exercise per Session: 0 min   Stress: No Stress Concern Present (4/18/2025)    Received from [facility]    [employer] - Occupational Stress Questionnaire     Feeling of Stress : Not at all   Housing Stability: Unknown (4/18/2025)    Received from [facility]    Housing Stability Vital Sign     Unable to Pay for Housing in the Last Year: No

## 2025-05-27 NOTE — PROGRESS NOTES
Subjective:     Patient ID: Rodrick Pearl is a 70 y.o. male.    Chief Complaint: Foot Ulcer and Diabetes Mellitus (3/22/25 Dr Hager)    Rodrick is a 70 y.o. male who presents to the clinic for evaluation and treatment of high risk feet. Rodrick has a past medical history of Diabetes mellitus and Hypertension. The patient's chief complaint is diabetic foot ulcer,B/L x several weeks. Presents with family. She thinks wheelchair may rub on patient's left foot causing a wound.   Also reports recent fall in bath tub injuring right foot toes 2,3,4.     4/10/2025 patient returns to clinic for follow up ulcers.  Previously seen by my colleague, new to me.  He relates that he has been attempting to care for the feet as instructed.  Since his last encounter he had BREEZY.  No new pedal complaints.  Denies nausea, vomiting, fever, chills.    4/17/2025 patient returns to clinic for follow up ulcers.  Previously seen by my colleague, new to me.  He relates that he has been attempting to care for the feet as instructed.  No new pedal complaints.  Denies nausea, vomiting, fever, chills.    4/24/25: F/u B/L ulcers. Reports applying iodosorb left foot daily.     5/1/25: F/u B/L ulceration. Presents in football dressing.     5/8/25: F/u B/L ulceration. Presents in football dressing.     5/22/25: New onset left 5th toe ulceration. Patient reports he thinks this occurred due to increased pressure to area during transfer to shower.   F/u left lateral foot ulceration.     05/29/2025 patient presents to clinic for follow up of bilateral foot ulcerations.  They relate that they were able to the left foot dressing clean, dry, intact.  He denies any new traumas.  He relates that when he does not have he usually normally wears hospital socks    PCP: Edgar Hager MD    Date Last Seen by PCP: per above        Hemoglobin A1C   Date Value Ref Range Status   03/21/2025 6.8 (H) 4.0 - 5.6 % Final     Comment:     ADA Screening Guidelines:  5.7-6.4%   Consistent with prediabetes  >or=6.5%  Consistent with diabetes    High levels of fetal hemoglobin interfere with the HbA1C  assay. Heterozygous hemoglobin variants (HbS, HgC, etc)do  not significantly interfere with this assay.   However, presence of multiple variants may affect accuracy.     03/21/2025 6.7 (H) 4.0 - 5.6 % Final     Comment:     ADA Screening Guidelines:  5.7-6.4%  Consistent with prediabetes  >or=6.5%  Consistent with diabetes    High levels of fetal hemoglobin interfere with the HbA1C  assay. Heterozygous hemoglobin variants (HbS, HgC, etc)do  not significantly interfere with this assay.   However, presence of multiple variants may affect accuracy.     04/09/2024 5.6 4.7 - 5.6 % Final   01/10/2024 6.5 (H) <5.7 % Final   07/19/2023 7.3 (H) <5.7 % Final   04/05/2023 8.2 (A) 4.0 - 6.0 % Final         Problem List[1]    Medications Ordered Prior to Encounter[2]    Review of patient's allergies indicates:   Allergen Reactions    Netarsudil Other (See Comments), Itching and Swelling     To eyes       Past Surgical History:   Procedure Laterality Date    ENDOSCOPY OF PROXIMAL SMALL INTESTINE N/A 6/3/2024    Procedure: ENTEROSCOPY, PROXIMAL;  Surgeon: Kj Zee MD;  Location: [facility];  Service: Endoscopy;  Laterality: N/A;  upper SBE    FEMUR FRACTURE SURGERY Right     TOE AMPUTATION Left     Great toe    TOE AMPUTATION Right     little toe    TONSILLECTOMY      TOTAL KNEE ARTHROPLASTY Right        Family History   Problem Relation Name Age of Onset    Lung cancer Father      Kidney cancer Sister      Breast cancer Sister radha     Prostate cancer Paternal Uncle      Lung cancer Paternal Uncle      Pancreatic cancer Paternal Aunt         Social History[3]        Review of Systems   Constitutional: Negative for chills.   Cardiovascular:  Negative for chest pain and claudication.   Respiratory:  Negative for cough.    Skin:  Positive for color change, dry skin and nail changes.  "  Musculoskeletal:  Positive for joint pain.   Gastrointestinal:  Negative for nausea.   Neurological:  Positive for paresthesias. Negative for numbness.   Psychiatric/Behavioral:  The patient is not nervous/anxious.         Objective:     Vitals:    05/29/25 1046   BP: 124/65   Weight: 105.7 kg (233 lb 0.4 oz)   Height: 5' 9" (1.753 m)   PainSc: 0-No pain         Physical Exam  Constitutional:       Appearance: He is well-developed.      Comments: Oriented to time, place, and person.   Cardiovascular:      Comments: DP and PT pulses are palpable bilaterally. 3 sec capillary refill time and toes and feet are warm to touch proximally .  There is  hair growth on the feet and toes b/l. There is no edema b/l. No spider veins or varicosities present b/l.     Musculoskeletal:      Comments: Equinus noted b/l ankles with < 10 deg DF noted. MMT 5/5 in DF/PF/Inv/Ev resistance with no reproduction of pain in any direction. Passive range of motion of ankle and pedal joints is painless b/l.     Feet:      Right foot:      Skin integrity: Ulcer and dry skin present. No callus.      Left foot:      Skin integrity: Ulcer and dry skin present. No callus.   Lymphadenopathy:      Comments: Negative lymphadenopathy bilateral popliteal fossa and tarsal tunnel.   Skin:     Comments: Hematoma right hallux    Left plantar lateral foot ulceration red granular base 0.2cmx0.2cmx0.1cm    Left 5th toe ulceration stable    Neurological:      Mental Status: He is alert.      Comments: Light touch, proprioception, and sharp/dull sensation are all intact bilaterally. Protective threshold with the [place]-Wienstein monofilament is intact bilaterally.    Psychiatric:         Behavior: Behavior is cooperative.         05/29/2025 5/22/25:    Left 5th toe.     Left lateral foot       Right hallux       4/17/2025                        4/10/2025    Right         Plantar heels        Left              3/31/25:  Right foot.       Left " foot       Narrative & Impression  EXAMINATION:  XR FOOT COMPLETE 3 VIEW BILATERAL     CLINICAL HISTORY:  wounds both feet; Non-pressure chronic ulcer of other part of left foot with unspecified severity     TECHNIQUE:  AP, lateral, and oblique views of both feet were performed.     COMPARISON:  None     FINDINGS:  Left: Prior partial resection change of the 5th distal metatarsal and great toe phalanges.  Cortical thickening with possible remote traumatic change or prior infection of the 2nd metatarsal.  Slight contour flattening at the metatarsal head with DJD change at the 2nd MTP.  Deformity with irregularity centered at the 2nd toe PIP, potentially on the basis of remote or indolent infection.  Scattered foot arthritic changes and calcaneal spurring.  Vascular calcification.  No acute displaced fracture.     Right: Prior resection change of the 5th toe phalanges.  Scattered foot arthritic changes and calcaneal spurring.  Vascular calcification.  No acute displaced fracture or seth osseous destruction.     Impression:     As above.        Electronically signed by:Richmond Biswas  Date:                                            04/01/2025  Time:                                           08:30        Noncompressible bilateral lower extremity TBIs.    Noncompressible right lower extremity TBI.    Recommend lower extremity arterial ultrasound.      Assessment:      Encounter Diagnoses   Name Primary?    Type II diabetes mellitus with neurological manifestations Yes    Ulcer of left foot, unspecified ulcer stage     Ulcer of right foot, limited to breakdown of skin              Plan:     Rodrick was seen today for foot ulcer and diabetes mellitus.    Diagnoses and all orders for this visit:    Type II diabetes mellitus with neurological manifestations    Ulcer of left foot, unspecified ulcer stage    Ulcer of right foot, limited to breakdown of skin          I counseled the patient on his conditions, their  implications and medical management.    Shoe inspection. Diabetic Foot Education. Patient reminded of the importance of good nutrition and blood sugar control to help prevent podiatric complications of diabetes. Patient instructed on proper foot hygeine. We discussed wearing proper shoe gear, daily foot inspections, never walking without protective shoe gear, caution putting sharp instruments to feet     All sites and virtually healed.    Dressings:  Cellerate and Silvercel  to the wounds of the left foot and ankle up to the right hallux  Offloading:  Breathable Foam foot ball to the left and bandage to the    Follow-up:Patient is to return to the clinic in 7-10 days for follow-up but should call [facility] immediately if any signs of infection, such as fever, chills, sweats, increased redness or pain.    Short-term goals include maintaining good offloading and minimizing bioburden, promoting granulation and epithelialization to healing.  Long-term goals include keeping the wound healed by good offloading and medical management under the direction of internist.                     [1]   Patient Active Problem List  Diagnosis    Prostate cancer metastatic to bone    HTN (hypertension)    Paroxysmal atrial fibrillation    Neurogenic bladder    Type 2 diabetes mellitus with circulatory disorder, with long-term current use of insulin    Type 2 diabetes mellitus with both eyes affected by moderate nonproliferative retinopathy and macular edema, with long-term current use of insulin    Peripheral vascular disease    Hypertensive chronic kidney disease with stage 5 chronic kidney disease or end stage renal disease    Suprapubic catheter    Aortic atherosclerosis   [2]   Current Outpatient Medications on File Prior to Visit   Medication Sig Dispense Refill    amLODIPine (NORVASC) 10 MG tablet Take 1 tablet (10 mg total) by mouth once daily. 90 tablet 3    aspirin (ECOTRIN) 81 MG EC tablet Take 81 mg by mouth once daily.       atorvastatin (LIPITOR) 10 MG tablet Take 10 mg by mouth once daily.      brimonidine 0.1% (ALPHAGAN P) 0.1 % Drop Place 1 drop into both eyes 3 (three) times daily.      dorzolamide (TRUSOPT) 2 % ophthalmic solution 1 drop 3 (three) times daily.      doxazosin (CARDURA) 2 MG tablet Take 2 mg by mouth every evening.      doxycycline (VIBRAMYCIN) 100 MG Cap Take 1 capsule (100 mg total) by mouth every 12 (twelve) hours. 20 capsule 0    doxycycline (VIBRAMYCIN) 100 MG Cap Take 1 capsule (100 mg total) by mouth every 12 (twelve) hours. 20 capsule 0    empagliflozin (JARDIANCE) 25 mg tablet Take 25 mg by mouth once daily.      ferrous sulfate (FEOSOL) Tab tablet Take 1 tablet by mouth daily with breakfast.      flash glucose sensor (FREESTYLE MARC 2 SENSOR) Kit 1 Device by Misc.(Non-Drug; Combo Route) route once daily. E11.59, Z79.4 1 kit 5    insulin aspart U-100 (NOVOLOG) 100 unit/mL injection Inject into the skin 3 (three) times daily before meals.      insulin degludec (TRESIBA FLEXTOUCH U-200) 200 unit/mL (3 mL) insulin pen Inject 46 Units into the skin every evening. 20 mL 3    metoprolol tartrate (LOPRESSOR) 50 MG tablet Take 1 tablet (50 mg total) by mouth 2 (two) times daily.      pantoprazole (PROTONIX) 40 MG tablet Take 40 mg by mouth once daily.      sodium bicarbonate 650 MG tablet Take 1 tablet by mouth every 8 (eight) hours.      XTANDI 40 mg Tab Take 2 tablets by mouth 2 (two) times daily.       No current facility-administered medications on file prior to visit.   [3]   Social History  Socioeconomic History    Marital status: Unknown   Tobacco Use    Smoking status: Never    Smokeless tobacco: Never   Substance and Sexual Activity    Alcohol use: Not Currently    Drug use: Never   Social History Narrative    Lives in [place] with his wife, Naomi. Also with son Kaz and daughter Althea. Previously worked as .      Social Drivers of Health     Financial Resource Strain: Low Risk   (4/18/2025)    Received from [facility]    Overall Financial Resource Strain (CARDIA)     Difficulty of Paying Living Expenses: Not very hard   Food Insecurity: Food Insecurity Present (4/18/2025)    Received from [facility]    Hunger Vital Sign     Worried About Running Out of Food in the Last Year: Never true     Ran Out of Food in the Last Year: Sometimes true   Transportation Needs: No Transportation Needs (4/18/2025)    Received from [facility]    PRAPARE - Transportation     Lack of Transportation (Medical): No     Lack of Transportation (Non-Medical): No   Physical Activity: Inactive (4/18/2025)    Received from [facility]    Exercise Vital Sign     Days of Exercise per Week: 0 days     Minutes of Exercise per Session: 0 min   Stress: No Stress Concern Present (4/18/2025)    Received from [facility]    [employer] [place] [employer] - Occupational Stress Questionnaire     Feeling of Stress : Not at all   Housing Stability: Unknown (4/18/2025)    Received from [facility]    Housing Stability Vital Sign     Unable to Pay for Housing in the Last Year: No

## 2025-05-29 ENCOUNTER — OFFICE VISIT (OUTPATIENT)
Dept: PODIATRY | Facility: CLINIC | Age: 70
End: 2025-05-29
Payer: MEDICARE

## 2025-05-29 VITALS
WEIGHT: 233 LBS | BODY MASS INDEX: 34.51 KG/M2 | DIASTOLIC BLOOD PRESSURE: 65 MMHG | SYSTOLIC BLOOD PRESSURE: 124 MMHG | HEIGHT: 69 IN

## 2025-05-29 DIAGNOSIS — E11.49 TYPE II DIABETES MELLITUS WITH NEUROLOGICAL MANIFESTATIONS: Primary | ICD-10-CM

## 2025-05-29 DIAGNOSIS — L97.529 ULCER OF LEFT FOOT, UNSPECIFIED ULCER STAGE: ICD-10-CM

## 2025-05-29 DIAGNOSIS — L97.511 ULCER OF RIGHT FOOT, LIMITED TO BREAKDOWN OF SKIN: ICD-10-CM

## 2025-05-29 PROCEDURE — 1101F PT FALLS ASSESS-DOCD LE1/YR: CPT | Mod: CPTII,S$GLB,, | Performed by: PODIATRIST

## 2025-05-29 PROCEDURE — 99213 OFFICE O/P EST LOW 20 MIN: CPT | Mod: S$GLB,,, | Performed by: PODIATRIST

## 2025-05-29 PROCEDURE — 3078F DIAST BP <80 MM HG: CPT | Mod: CPTII,S$GLB,, | Performed by: PODIATRIST

## 2025-05-29 PROCEDURE — 99999 PR PBB SHADOW E&M-EST. PATIENT-LVL IV: CPT | Mod: PBBFAC,,, | Performed by: PODIATRIST

## 2025-05-29 PROCEDURE — 1126F AMNT PAIN NOTED NONE PRSNT: CPT | Mod: CPTII,S$GLB,, | Performed by: PODIATRIST

## 2025-05-29 PROCEDURE — 3008F BODY MASS INDEX DOCD: CPT | Mod: CPTII,S$GLB,, | Performed by: PODIATRIST

## 2025-05-29 PROCEDURE — 1160F RVW MEDS BY RX/DR IN RCRD: CPT | Mod: CPTII,S$GLB,, | Performed by: PODIATRIST

## 2025-05-29 PROCEDURE — 3044F HG A1C LEVEL LT 7.0%: CPT | Mod: CPTII,S$GLB,, | Performed by: PODIATRIST

## 2025-05-29 PROCEDURE — 1159F MED LIST DOCD IN RCRD: CPT | Mod: CPTII,S$GLB,, | Performed by: PODIATRIST

## 2025-05-29 PROCEDURE — 3074F SYST BP LT 130 MM HG: CPT | Mod: CPTII,S$GLB,, | Performed by: PODIATRIST

## 2025-05-29 PROCEDURE — 4010F ACE/ARB THERAPY RXD/TAKEN: CPT | Mod: CPTII,S$GLB,, | Performed by: PODIATRIST

## 2025-05-29 PROCEDURE — 3288F FALL RISK ASSESSMENT DOCD: CPT | Mod: CPTII,S$GLB,, | Performed by: PODIATRIST

## 2025-06-04 ENCOUNTER — TELEPHONE (OUTPATIENT)
Dept: FAMILY MEDICINE | Facility: CLINIC | Age: 70
End: 2025-06-04
Payer: MEDICARE

## 2025-06-04 DIAGNOSIS — E11.59 TYPE 2 DIABETES MELLITUS WITH OTHER CIRCULATORY COMPLICATION, WITH LONG-TERM CURRENT USE OF INSULIN: ICD-10-CM

## 2025-06-04 DIAGNOSIS — Z79.4 TYPE 2 DIABETES MELLITUS WITH OTHER CIRCULATORY COMPLICATION, WITH LONG-TERM CURRENT USE OF INSULIN: ICD-10-CM

## 2025-06-04 RX ORDER — BLOOD-GLUCOSE SENSOR
1 EACH MISCELLANEOUS DAILY
Qty: 3 EACH | Refills: 11 | Status: SHIPPED | OUTPATIENT
Start: 2025-06-04 | End: 2026-06-04

## 2025-06-05 ENCOUNTER — OFFICE VISIT (OUTPATIENT)
Dept: PODIATRY | Facility: CLINIC | Age: 70
End: 2025-06-05
Payer: MEDICARE

## 2025-06-05 VITALS
SYSTOLIC BLOOD PRESSURE: 167 MMHG | DIASTOLIC BLOOD PRESSURE: 74 MMHG | WEIGHT: 233 LBS | BODY MASS INDEX: 34.51 KG/M2 | HEIGHT: 69 IN

## 2025-06-05 DIAGNOSIS — Z86.31 HEALED DIABETIC FOOT ULCER: ICD-10-CM

## 2025-06-05 DIAGNOSIS — E11.49 TYPE II DIABETES MELLITUS WITH NEUROLOGICAL MANIFESTATIONS: Primary | ICD-10-CM

## 2025-06-05 PROCEDURE — 1101F PT FALLS ASSESS-DOCD LE1/YR: CPT | Mod: CPTII,HCNC,S$GLB, | Performed by: PODIATRIST

## 2025-06-05 PROCEDURE — 1126F AMNT PAIN NOTED NONE PRSNT: CPT | Mod: CPTII,HCNC,S$GLB, | Performed by: PODIATRIST

## 2025-06-05 PROCEDURE — 1159F MED LIST DOCD IN RCRD: CPT | Mod: CPTII,HCNC,S$GLB, | Performed by: PODIATRIST

## 2025-06-05 PROCEDURE — 3078F DIAST BP <80 MM HG: CPT | Mod: CPTII,HCNC,S$GLB, | Performed by: PODIATRIST

## 2025-06-05 PROCEDURE — 3008F BODY MASS INDEX DOCD: CPT | Mod: CPTII,HCNC,S$GLB, | Performed by: PODIATRIST

## 2025-06-05 PROCEDURE — 99999 PR PBB SHADOW E&M-EST. PATIENT-LVL V: CPT | Mod: PBBFAC,HCNC,, | Performed by: PODIATRIST

## 2025-06-05 PROCEDURE — 1160F RVW MEDS BY RX/DR IN RCRD: CPT | Mod: CPTII,HCNC,S$GLB, | Performed by: PODIATRIST

## 2025-06-05 PROCEDURE — 4010F ACE/ARB THERAPY RXD/TAKEN: CPT | Mod: CPTII,HCNC,S$GLB, | Performed by: PODIATRIST

## 2025-06-05 PROCEDURE — 3288F FALL RISK ASSESSMENT DOCD: CPT | Mod: CPTII,HCNC,S$GLB, | Performed by: PODIATRIST

## 2025-06-05 PROCEDURE — 3044F HG A1C LEVEL LT 7.0%: CPT | Mod: CPTII,HCNC,S$GLB, | Performed by: PODIATRIST

## 2025-06-05 PROCEDURE — 3077F SYST BP >= 140 MM HG: CPT | Mod: CPTII,HCNC,S$GLB, | Performed by: PODIATRIST

## 2025-06-05 PROCEDURE — 99213 OFFICE O/P EST LOW 20 MIN: CPT | Mod: HCNC,S$GLB,, | Performed by: PODIATRIST

## 2025-06-05 NOTE — PATIENT INSTRUCTIONS
Wound has healed well with no signs of continued skin breakdown noted however skin is still thin and vulnerable   Transition into supportive shoe gear at this time. No barefeet or socks only, no slide on or flat shoes  Check feet daily for signs of drainage or lesion re-opening   Use of daily foot moisturizer to feet, avoiding the webspace's and wound site  Apply betadine or gentian violet to healed wound site every 12 hours  Avoid soaking in any water for the first 6 weeks, clean foot separate from the body.  Week 7 limit showers/bathing to roughly 15 minutes to prevent skin breakdown, try to keep wound dry during this time       Recommend over the counter medicine for nerve lit:  - Capsaicin cream to rub on at night  - Alpha lipoic acid 600 mg Cap; Take 1 capsule by mouth once daily for neuropathy or a B complex vitamin daily    Over the counter pain creams: Voltaren Gel, Biofreeze, Bengay, tiger balm, two old goat, lidocaine gel,  Absorbine Veterinary Liniment Gel Topical Analgesic Sore Muscle and Joint Pain Relief    Recommend lotions: eucerin, eucerin for diabetics, aquaphor, A&D ointment, gold bond for diabetics, sween, [place] Bees all purpose baby ointment,  urea 40 with aloe or SkinIntegra rapid crack repair (found on amazon.com)    Shoe recommendations: (try 6pm.com, zappos.Wikinvest , nordstromraSTEERads.Wikinvest, or shoes.Wikinvest for discounted prices) you can visit varsity shoes in [place], DSW shoes in [place]  or zoya rack in the [facility] (there are also several shoe brand outlets in the [facility])    ONLY purchase stability style tennis shoes AVOID flex, foam, free, yoga mat style shoes or shoes that claim to feel like clouds  If you have a flatter foot purchase shoes for PRONATION  If you have a high arch purchase shoes for SUPINATION    Shoe examples:    Asics (GT or gel foundations, gel-kayano-30), new balance stability type shoes (such as the 940 series or the [id number]), saucony (Guide 16, stabil c3),   Aguilar (GTS or Beast or   transcend), propet, HokaOne (joss 7, arahi, myranda) Giuliano (tennis shoes and boots)    Sofft Brand (women) Anh&Zafar (men), clarks, crocs, aerosoles, naturalizers, SAS, ecco, born, nessa montes, rockports (dress shoes)    Vionic, burkenstocks, fitflops, propet, taos, baretraps, Hoka or vionic recovery slides/sandals (sandals)    Hoka or vionic recovery slides/sandals, crocs, propet (house shoes)      Nail Home remedy:  Vicks Vapor rub or Emuaid to nails for easier manageability      Wearing Proper Shoes                    You walk on your feet every day, forcing them to support the weight of your body. Repeated stress on your feet can cause damage over time. The right shoes can help protect your feet. The wrong shoes can cause more foot problems. Read the information below to help you find a shoe that fits your foot needs.      A good shoe fit will cover your foot outline. A shoe that doesnt cover the outline is a bad fit.   Whats your foot shape?  To get a good fit, you need to know the shape of your foot. Do this simple test: While standing, place your foot on a piece of paper and trace around it. Is your foot straight or curved? Do you have a foot problem, such as a bunion, that causes your foot outline to show a bulge on the side of your big toe?  Finding your fit  Bring your foot outline to the shoe store to help you find the right shoe. Place a shoe you like on top of the outline to see if it matches the shape. The shoe should cover the outline. (If you have a bunion, the shoe may not cover the bulge on the outline. Look for soft leather shoes to stretch over the bunion.) Once youve found a pair of proper shoes, put them on. Walk around. Be sure the shoes dont rub or pinch. If the shoes feel good, youve found your fit!  The right shoe for you  A good shoe has features that provide comfort and support. It must also be the right size and shape for your feet. Look for a shoe  made of breathable fabric and lining, such as leather or canvas. Be sure that shoes have enough tread to prevent slipping. Go to a good shoe store for help finding the right shoe.  Good shoe features  An ideal shoe has the following:  Laces for support. If tying laces is a problem for you, try shoes with Velcro fasteners or jesus.  A front of the shoe (toe box) with ½ inch space in front of your longest toes.  An arch shape that supports your foot.  No more than 1½ inches of heel.  A stiff, snug back of the shoe to keep your foot from sliding around.  A smooth lining with no rough seams.  Shoe shopping tips  Below are some dos and donts for when you go to the shoe store.  Do:  Select the shoes that feel right. Wear them around the house. Then bring them to your foot healthcare provider to check for fit. If they dont fit well, return them.  Shop late in the day, when your feet will be slightly bigger.  Each time you buy shoes, have both your feet measured while you are standing. Foot size changes with time.  Pick shoes to suit their purpose. High heels are OK for an occasional night on the town. But for everyday wear, choose a more sensible shoe.  Try on shoes while wearing any inserts specially made for your feet (orthoses).  Try on both the right and left shoes. If your feet are different sizes, pick a pair that fits the larger foot.  Dont:  Dont buy shoes based on shoe size alone. Always try on shoes, as sizes differ from brand to brand and within brands.  Dont expect shoes to break in. If they dont fit at the store, dont buy them.  Dont buy a shoe that doesnt match your foot shape.  What about socks?  Always wear socks with shoes. Socks help absorb sweat and reduce friction and blistering. When shopping for shoes, choose soft, padded socks with seams that dont irritate your feet.  If you have foot problems  Some foot problems cause deformities. This can make it hard to find a good fit. Look for  shoes made of soft leather to stretch over the deformity. If you have bunions, buy shoes with a wider toe box. To fit hammertoes, look for shoes with a tall toe box. If you have arch problems, you may need inserts. In some cases, youll need to have custom footwear or orthoses made for your feet.  Suggested footwear  Ask your healthcare provider what kind of footwear you need. He or she may recommend a certain brand or shoe store.  Date Last Reviewed: 8/1/2016  © 2332-9830 [employer]. [address] [place] [address]. All rights reserved. This information is not intended as a substitute for professional medical care. Always follow your healthcare professional's instructions.        Step-by-Step:  Inspecting Your Feet

## 2025-06-09 ENCOUNTER — PATIENT MESSAGE (OUTPATIENT)
Dept: ADMINISTRATIVE | Facility: HOSPITAL | Age: 70
End: 2025-06-09
Payer: MEDICARE

## 2025-06-17 NOTE — PROGRESS NOTES
Subjective:     Patient ID: Rodrick Pearl is a 70 y.o. male.    Chief Complaint: Diabetes Mellitus (3/22/25 Dr Hager) and Wound Check    Rodrick is a 70 y.o. male who presents to the clinic for evaluation and treatment of high risk feet. Rodrick has a past medical history of Diabetes mellitus and Hypertension. The patient's chief complaint is diabetic foot ulcer,B/L x several weeks. Presents with family. She thinks wheelchair may rub on patient's left foot causing a wound.   Also reports recent fall in bath tub injuring right foot toes 2,3,4.     4/10/2025 patient returns to clinic for follow up ulcers.  Previously seen by my colleague, new to me.  He relates that he has been attempting to care for the feet as instructed.  Since his last encounter he had BREEZY.  No new pedal complaints.  Denies nausea, vomiting, fever, chills.    4/17/2025 patient returns to clinic for follow up ulcers.  Previously seen by my colleague, new to me.  He relates that he has been attempting to care for the feet as instructed.  No new pedal complaints.  Denies nausea, vomiting, fever, chills.    4/24/25: F/u B/L ulcers. Reports applying iodosorb left foot daily.     5/1/25: F/u B/L ulceration. Presents in football dressing.     5/8/25: F/u B/L ulceration. Presents in football dressing.     5/22/25: New onset left 5th toe ulceration. Patient reports he thinks this occurred due to increased pressure to area during transfer to shower.   F/u left lateral foot ulceration.     05/29/2025 patient presents to clinic for follow up of bilateral foot ulcerations.  They relate that they were able to the left foot dressing clean, dry, intact.  He denies any new traumas.  He relates that when he does not have he usually normally wears hospital socks    06/05/2025 patient presents to clinic for follow up of bilateral foot ulcerations.  They relate that they has been caring for the feet as instructed and has been utilizing shoes as we discuss that they were able  to purchased since our last encounter.  No new wounds or skin changes has been noted since our last encounter.       PCP: Edgar Hager MD    Date Last Seen by PCP: per above        Hemoglobin A1C   Date Value Ref Range Status   03/21/2025 6.8 (H) 4.0 - 5.6 % Final     Comment:     ADA Screening Guidelines:  5.7-6.4%  Consistent with prediabetes  >or=6.5%  Consistent with diabetes    High levels of fetal hemoglobin interfere with the HbA1C  assay. Heterozygous hemoglobin variants (HbS, HgC, etc)do  not significantly interfere with this assay.   However, presence of multiple variants may affect accuracy.     03/21/2025 6.7 (H) 4.0 - 5.6 % Final     Comment:     ADA Screening Guidelines:  5.7-6.4%  Consistent with prediabetes  >or=6.5%  Consistent with diabetes    High levels of fetal hemoglobin interfere with the HbA1C  assay. Heterozygous hemoglobin variants (HbS, HgC, etc)do  not significantly interfere with this assay.   However, presence of multiple variants may affect accuracy.     04/09/2024 5.6 4.7 - 5.6 % Final   01/10/2024 6.5 (H) <5.7 % Final   07/19/2023 7.3 (H) <5.7 % Final   04/05/2023 8.2 (A) 4.0 - 6.0 % Final         Problem List[1]    Medications Ordered Prior to Encounter[2]    Review of patient's allergies indicates:   Allergen Reactions    Netarsudil Other (See Comments), Itching and Swelling     To eyes       Past Surgical History:   Procedure Laterality Date    ENDOSCOPY OF PROXIMAL SMALL INTESTINE N/A 6/3/2024    Procedure: ENTEROSCOPY, PROXIMAL;  Surgeon: Kj Zee MD;  Location: [facility];  Service: Endoscopy;  Laterality: N/A;  upper SBE    FEMUR FRACTURE SURGERY Right     TOE AMPUTATION Left     Great toe    TOE AMPUTATION Right     little toe    TONSILLECTOMY      TOTAL KNEE ARTHROPLASTY Right        Family History   Problem Relation Name Age of Onset    Lung cancer Father      Kidney cancer Sister      Breast cancer Sister radha     Prostate cancer Paternal Uncle      Lung  "cancer Paternal Uncle      Pancreatic cancer Paternal Aunt         Social History[3]        Review of Systems   Constitutional: Negative for chills.   Cardiovascular:  Negative for chest pain and claudication.   Respiratory:  Negative for cough.    Skin:  Positive for color change, dry skin and nail changes.   Musculoskeletal:  Positive for joint pain.   Gastrointestinal:  Negative for nausea.   Neurological:  Positive for paresthesias. Negative for numbness.   Psychiatric/Behavioral:  The patient is not nervous/anxious.         Objective:     Vitals:    06/05/25 1029   BP: (!) 167/74   Weight: 105.7 kg (233 lb 0.4 oz)   Height: 5' 9" (1.753 m)   PainSc: 0-No pain         Physical Exam  Constitutional:       Appearance: He is well-developed.      Comments: Oriented to time, place, and person.   Cardiovascular:      Comments: DP and PT pulses are palpable bilaterally. 3 sec capillary refill time and toes and feet are warm to touch proximally .  There is  hair growth on the feet and toes b/l. There is no edema b/l. No spider veins or varicosities present b/l.     Musculoskeletal:      Comments: Equinus noted b/l ankles with < 10 deg DF noted. MMT 5/5 in DF/PF/Inv/Ev resistance with no reproduction of pain in any direction. Passive range of motion of ankle and pedal joints is painless b/l.     Feet:      Right foot:      Skin integrity: Ulcer and dry skin present. No callus.      Left foot:      Skin integrity: Ulcer and dry skin present. No callus.   Lymphadenopathy:      Comments: Negative lymphadenopathy bilateral popliteal fossa and tarsal tunnel.   Skin:     Comments: Hematoma right hallux    Left plantar lateral foot ulceration red granular base 0.2cmx0.2cmx0.1cm    Left 5th toe ulceration stable    Neurological:      Mental Status: He is alert.      Comments: Light touch, proprioception, and sharp/dull sensation are all intact bilaterally. Protective threshold with the Windthorst-Wienstein monofilament is intact " bilaterally.    Psychiatric:         Behavior: Behavior is cooperative.         06/06/2025 05/29/2025 5/22/25:    Left 5th toe.     Left lateral foot       Right hallux       4/17/2025                        4/10/2025    Right         Plantar heels        Left              3/31/25:  Right foot.       Left foot       Narrative & Impression  EXAMINATION:  XR FOOT COMPLETE 3 VIEW BILATERAL     CLINICAL HISTORY:  wounds both feet; Non-pressure chronic ulcer of other part of left foot with unspecified severity     TECHNIQUE:  AP, lateral, and oblique views of both feet were performed.     COMPARISON:  None     FINDINGS:  Left: Prior partial resection change of the 5th distal metatarsal and great toe phalanges.  Cortical thickening with possible remote traumatic change or prior infection of the 2nd metatarsal.  Slight contour flattening at the metatarsal head with DJD change at the 2nd MTP.  Deformity with irregularity centered at the 2nd toe PIP, potentially on the basis of remote or indolent infection.  Scattered foot arthritic changes and calcaneal spurring.  Vascular calcification.  No acute displaced fracture.     Right: Prior resection change of the 5th toe phalanges.  Scattered foot arthritic changes and calcaneal spurring.  Vascular calcification.  No acute displaced fracture or seth osseous destruction.     Impression:     As above.        Electronically signed by:Richmond Biswas  Date:                                            04/01/2025  Time:                                           08:30        Noncompressible bilateral lower extremity TBIs.    Noncompressible right lower extremity TBI.    Recommend lower extremity arterial ultrasound.      Assessment:      Encounter Diagnoses   Name Primary?    Type II diabetes mellitus with neurological manifestations Yes    Healed diabetic foot ulcer              Plan:     Rodrick was seen today for diabetes mellitus and wound  check.    Diagnoses and all orders for this visit:    Type II diabetes mellitus with neurological manifestations    Healed diabetic foot ulcer        I counseled the patient on his conditions, their implications and medical management.    Shoe inspection. Diabetic Foot Education. Patient reminded of the importance of good nutrition and blood sugar control to help prevent podiatric complications of diabetes. Patient instructed on proper foot hygeine. We discussed wearing proper shoe gear, daily foot inspections, never walking without protective shoe gear, caution putting sharp instruments to feet     Wound has healed well with no signs of continued skin breakdown noted.  Continue in recommended shoe gear. Skin is still delicate therefore patient must be diligent in avoiding excessive pressure and making sure there is adequate support and padding in shoe gear.     Follow-up:Patient is to return to the clinic in 2-3 months for follow-up but should call [facility] immediately if any signs of infection, such as fever, chills, sweats, increased redness or pain.    Long-term goals include keeping the wound healed by good offloading and medical management under the direction of internist.                       [1]   Patient Active Problem List  Diagnosis    Prostate cancer metastatic to bone    HTN (hypertension)    Paroxysmal atrial fibrillation    Neurogenic bladder    Type 2 diabetes mellitus with circulatory disorder, with long-term current use of insulin    Type 2 diabetes mellitus with both eyes affected by moderate nonproliferative retinopathy and macular edema, with long-term current use of insulin    Peripheral vascular disease    Hypertensive chronic kidney disease with stage 5 chronic kidney disease or end stage renal disease    Suprapubic catheter    Aortic atherosclerosis   [2]   Current Outpatient Medications on File Prior to Visit   Medication Sig Dispense Refill    amLODIPine (NORVASC) 10 MG tablet Take 1  tablet (10 mg total) by mouth once daily. 90 tablet 3    aspirin (ECOTRIN) 81 MG EC tablet Take 81 mg by mouth once daily.      atorvastatin (LIPITOR) 10 MG tablet Take 10 mg by mouth once daily.      blood-glucose sensor (FREESTYLE MARC 2 PLUS SENSOR) Chen 1 Device by Misc.(Non-Drug; Combo Route) route once daily. 3 each 11    brimonidine 0.1% (ALPHAGAN P) 0.1 % Drop Place 1 drop into both eyes 3 (three) times daily.      dorzolamide (TRUSOPT) 2 % ophthalmic solution 1 drop 3 (three) times daily.      doxazosin (CARDURA) 2 MG tablet Take 2 mg by mouth every evening.      doxycycline (VIBRAMYCIN) 100 MG Cap Take 1 capsule (100 mg total) by mouth every 12 (twelve) hours. 20 capsule 0    doxycycline (VIBRAMYCIN) 100 MG Cap Take 1 capsule (100 mg total) by mouth every 12 (twelve) hours. 20 capsule 0    empagliflozin (JARDIANCE) 25 mg tablet Take 25 mg by mouth once daily.      ferrous sulfate (FEOSOL) Tab tablet Take 1 tablet by mouth daily with breakfast.      flash glucose sensor (FREESTYLE MARC 2 SENSOR) Kit 1 Device by Misc.(Non-Drug; Combo Route) route once daily. E11.59, Z79.4 1 kit 5    insulin aspart U-100 (NOVOLOG) 100 unit/mL injection Inject into the skin 3 (three) times daily before meals.      insulin degludec (TRESIBA FLEXTOUCH U-200) 200 unit/mL (3 mL) insulin pen Inject 46 Units into the skin every evening. 20 mL 3    metoprolol tartrate (LOPRESSOR) 50 MG tablet Take 1 tablet (50 mg total) by mouth 2 (two) times daily.      pantoprazole (PROTONIX) 40 MG tablet Take 40 mg by mouth once daily.      sodium bicarbonate 650 MG tablet Take 1 tablet by mouth every 8 (eight) hours.      XTANDI 40 mg Tab Take 2 tablets by mouth 2 (two) times daily.       No current facility-administered medications on file prior to visit.   [3]   Social History  Socioeconomic History    Marital status: Unknown   Tobacco Use    Smoking status: Never    Smokeless tobacco: Never   Substance and Sexual Activity    Alcohol use: Not  Currently    Drug use: Never   Social History Narrative    Lives in [place] with his wife, Naomi. Also with son Kaz and daughter Althea. Previously worked as .      Social Drivers of Health     Financial Resource Strain: Low Risk  (4/18/2025)    Received from [facility]    Overall Financial Resource Strain (CARDIA)     Difficulty of Paying Living Expenses: Not very hard   Food Insecurity: Food Insecurity Present (4/18/2025)    Received from [facility]    Hunger Vital Sign     Worried About Running Out of Food in the Last Year: Never true     Ran Out of Food in the Last Year: Sometimes true   Transportation Needs: No Transportation Needs (4/18/2025)    Received from [facility]    PRAPARE - Transportation     Lack of Transportation (Medical): No     Lack of Transportation (Non-Medical): No   Physical Activity: Inactive (4/18/2025)    Received from [facility]    Exercise Vital Sign     Days of Exercise per Week: 0 days     Minutes of Exercise per Session: 0 min   Stress: No Stress Concern Present (4/18/2025)    Received from [facility]    [employer] [place] [employer] - Occupational Stress Questionnaire     Feeling of Stress : Not at all   Housing Stability: Unknown (4/18/2025)    Received from [facility]    Housing Stability Vital Sign     Unable to Pay for Housing in the Last Year: No

## 2025-06-23 DIAGNOSIS — Z00.00 ENCOUNTER FOR MEDICARE ANNUAL WELLNESS EXAM: ICD-10-CM

## 2025-07-30 ENCOUNTER — OFFICE VISIT (OUTPATIENT)
Dept: PODIATRY | Facility: CLINIC | Age: 70
End: 2025-07-30
Payer: MEDICARE

## 2025-07-30 VITALS
WEIGHT: 233 LBS | HEIGHT: 69 IN | SYSTOLIC BLOOD PRESSURE: 138 MMHG | BODY MASS INDEX: 34.51 KG/M2 | DIASTOLIC BLOOD PRESSURE: 60 MMHG

## 2025-07-30 DIAGNOSIS — E11.49 TYPE II DIABETES MELLITUS WITH NEUROLOGICAL MANIFESTATIONS: Primary | ICD-10-CM

## 2025-07-30 DIAGNOSIS — Z86.31 HEALED DIABETIC FOOT ULCER: ICD-10-CM

## 2025-07-30 DIAGNOSIS — E11.51 TYPE II DIABETES MELLITUS WITH PERIPHERAL CIRCULATORY DISORDER: ICD-10-CM

## 2025-07-30 PROCEDURE — 3044F HG A1C LEVEL LT 7.0%: CPT | Mod: CPTII,HCNC,S$GLB, | Performed by: PODIATRIST

## 2025-07-30 PROCEDURE — 1101F PT FALLS ASSESS-DOCD LE1/YR: CPT | Mod: CPTII,HCNC,S$GLB, | Performed by: PODIATRIST

## 2025-07-30 PROCEDURE — 3288F FALL RISK ASSESSMENT DOCD: CPT | Mod: CPTII,HCNC,S$GLB, | Performed by: PODIATRIST

## 2025-07-30 PROCEDURE — 1160F RVW MEDS BY RX/DR IN RCRD: CPT | Mod: CPTII,HCNC,S$GLB, | Performed by: PODIATRIST

## 2025-07-30 PROCEDURE — 3078F DIAST BP <80 MM HG: CPT | Mod: CPTII,HCNC,S$GLB, | Performed by: PODIATRIST

## 2025-07-30 PROCEDURE — 3075F SYST BP GE 130 - 139MM HG: CPT | Mod: CPTII,HCNC,S$GLB, | Performed by: PODIATRIST

## 2025-07-30 PROCEDURE — 99213 OFFICE O/P EST LOW 20 MIN: CPT | Mod: HCNC,S$GLB,, | Performed by: PODIATRIST

## 2025-07-30 PROCEDURE — 1126F AMNT PAIN NOTED NONE PRSNT: CPT | Mod: CPTII,HCNC,S$GLB, | Performed by: PODIATRIST

## 2025-07-30 PROCEDURE — 99999 PR PBB SHADOW E&M-EST. PATIENT-LVL IV: CPT | Mod: PBBFAC,HCNC,, | Performed by: PODIATRIST

## 2025-07-30 PROCEDURE — 1159F MED LIST DOCD IN RCRD: CPT | Mod: CPTII,HCNC,S$GLB, | Performed by: PODIATRIST

## 2025-07-30 PROCEDURE — 3008F BODY MASS INDEX DOCD: CPT | Mod: CPTII,HCNC,S$GLB, | Performed by: PODIATRIST

## 2025-07-30 PROCEDURE — 4010F ACE/ARB THERAPY RXD/TAKEN: CPT | Mod: CPTII,HCNC,S$GLB, | Performed by: PODIATRIST

## 2025-07-30 NOTE — PROGRESS NOTES
Podiatry Progress Note      Subjective:      Patient ID: Rodrick Pearl is a 70 y.o. male.    History of Present Illness    CHIEF COMPLAINT:  - Rodrick presents for follow-up evaluation of foot care and management.    HPI:  Rodrick presents for follow-up regarding foot care. He reports using lotion on his feet and wearing regular socks and slippers. He has a bath mat to prevent scraping. He previously used compression socks but discontinued them as they became too tight. He is able to elevate his feet but also has them down quite a bit throughout the day. He has a history of wounds and digital  amputation. He is compliant with his current foot care regimen and expresses willingness to continue.    He has a stationary pedal bike but has not used it recently. He previously used the bike regularly. There is residual blood under a toenail, which he confirms is old.    He denies any formal medical diagnoses.    PREVIOUS TREATMENTS:  - Lotion application on feet  - Regular socks and slippers  - Bath mat use  - Compression socks: Discontinued due to tightness           Chief Complaint: Diabetes Mellitus (3/21/25 Dr Hager) and Wound Check      Problem List[1]    Medications Ordered Prior to Encounter[2]    Review of patient's allergies indicates:   Allergen Reactions    Netarsudil Other (See Comments), Itching and Swelling     To eyes       Past Surgical History:   Procedure Laterality Date    ENDOSCOPY OF PROXIMAL SMALL INTESTINE N/A 6/3/2024    Procedure: ENTEROSCOPY, PROXIMAL;  Surgeon: Kj Zee MD;  Location: [facility];  Service: Endoscopy;  Laterality: N/A;  upper SBE    FEMUR FRACTURE SURGERY Right     TOE AMPUTATION Left     Great toe    TOE AMPUTATION Right     little toe    TONSILLECTOMY      TOTAL KNEE ARTHROPLASTY Right        Family History   Problem Relation Name Age of Onset    Lung cancer Father      Kidney cancer Sister      Breast cancer Sister radha     Prostate cancer Paternal Uncle      Lung cancer  "Paternal Uncle      Pancreatic cancer Paternal Aunt         Social History[3]    Review of Systems   Constitutional: Negative for chills.   Cardiovascular:  Positive for leg swelling. Negative for chest pain and claudication.   Respiratory:  Negative for cough.    Skin:  Positive for color change, dry skin and nail changes.   Musculoskeletal:  Positive for joint pain, myalgias and stiffness.   Gastrointestinal:  Negative for nausea.   Neurological:  Positive for paresthesias. Negative for numbness.   Psychiatric/Behavioral:  The patient is not nervous/anxious.            Objective:      Vitals:    07/30/25 1044   BP: 138/60   Weight: 105.7 kg (233 lb 0.4 oz)   Height: 5' 9" (1.753 m)   PainSc: 0-No pain       Physical Exam  Constitutional:       Appearance: He is well-developed.      Comments: Oriented to time, place, and person.   Cardiovascular:      Comments: DP and PT pulses are palpable bilaterally. 3 sec capillary refill time and toes and feet are warm to touch proximally .  Musculoskeletal:      Right lower leg: Edema present.      Left lower leg: Edema present.      Comments: There is equinus deformity bilateral with decreased dorsiflexion at the ankle joint bilateral.     Decreased first MPJ range of motion both weightbearing and nonweightbearing, no crepitus observed the first MP joint, + dorsal flag sign.     Patient has hammertoes of digits 2-5 bilateral partially reducible        Feet:      Right foot:      Skin integrity: Callus and dry skin present.      Toenail Condition: Fungal disease present.     Left foot:      Skin integrity: Callus and dry skin present.      Toenail Condition: Fungal disease present.     Comments: Hematoma right hallux  Lymphadenopathy:      Comments: Negative lymphadenopathy bilateral popliteal fossa and tarsal tunnel.   Neurological:      Mental Status: He is alert.      Comments: Light touch, proprioception, and sharp/dull sensation are all intact bilaterally. Protective " threshold with the Paxton-Wienstein monofilament is intact bilaterally.    Psychiatric:         Behavior: Behavior is cooperative.       07/30/2025 06/06/2025 05/29/2025 5/22/25:    Left 5th toe.     Left lateral foot       Right hallux       4/17/2025                        4/10/2025    Right         Plantar heels        Left              3/31/25:  Right foot.       Left foot             Assessment and Plan:       Assessment & Plan    E11.49 Type II diabetes mellitus with neurological manifestations  E11.51 Type II diabetes mellitus with peripheral circulatory disorder  Z86.31 Healed diabetic foot ulcer    Education about the prevention of limb loss.    Wounds have remained healed without continued skin breakdown noted. Skin is still delicate therefore patient must be diligent in avoiding excessive pressure and making sure there is adequate support and padding in shoe gear.     Long-term goals include keeping the wound healed by good offloading and medical management under the direction of internist.    Shoe inspection. Diabetic Foot Education. Patient reminded of the importance of good nutrition and blood sugar control to help prevent podiatric complications of diabetes. Patient instructed on proper foot hygeine. We discussed wearing proper shoe gear, daily foot inspections, never walking without protective shoe gear, never putting sharp instruments to feet.        TYPE II DIABETES MELLITUS WITH NEUROLOGICAL MANIFESTATIONS:  > Use stationary pedal bike for exercise, ensuring feet are protected.    TYPE II DIABETES MELLITUS WITH PERIPHERAL CIRCULATORY DISORDER:  > Placed temporary compression sock (Tubi ) on patient during visit.  > Try OTC compression socks (10-15 mmHg) for swelling control.  > Elevate feet when possible to control swelling.    HEALED DIABETIC FOOT ULCER:  > Replace slippers every 4-6 months to maintain integrity.  > Continue current foot care  routine (lotion application, regular socks, slippers).  > Referred to Dr. Glaser for regular podiatry care, including nail trimming and callus management.  > Follow up with Dr. Glaser in 2-3 months for nail care.              Procedures          This note was generated with the assistance of ambient listening technology. Verbal consent was obtained by the patient and accompanying visitor(s) for the recording of patient appointment to facilitate this note. I attest to having reviewed and edited the generated note for accuracy, though some syntax or spelling errors may persist. Please contact the author of this note for any clarification.            [1]   Patient Active Problem List  Diagnosis    Prostate cancer metastatic to bone    HTN (hypertension)    Paroxysmal atrial fibrillation    Neurogenic bladder    Type 2 diabetes mellitus with circulatory disorder, with long-term current use of insulin    Type 2 diabetes mellitus with both eyes affected by moderate nonproliferative retinopathy and macular edema, with long-term current use of insulin    Peripheral vascular disease    Hypertensive chronic kidney disease with stage 5 chronic kidney disease or end stage renal disease    Suprapubic catheter    Aortic atherosclerosis   [2]   Current Outpatient Medications on File Prior to Visit   Medication Sig Dispense Refill    amLODIPine (NORVASC) 10 MG tablet Take 1 tablet (10 mg total) by mouth once daily. 90 tablet 3    aspirin (ECOTRIN) 81 MG EC tablet Take 81 mg by mouth once daily.      atorvastatin (LIPITOR) 10 MG tablet Take 10 mg by mouth once daily.      blood-glucose sensor (FREESTYLE MARC 2 PLUS SENSOR) Chen 1 Device by Misc.(Non-Drug; Combo Route) route once daily. 3 each 11    brimonidine 0.1% (ALPHAGAN P) 0.1 % Drop Place 1 drop into both eyes 3 (three) times daily.      dorzolamide (TRUSOPT) 2 % ophthalmic solution 1 drop 3 (three) times daily.      doxazosin (CARDURA) 2 MG tablet Take 2 mg by mouth every  evening.      doxycycline (VIBRAMYCIN) 100 MG Cap Take 1 capsule (100 mg total) by mouth every 12 (twelve) hours. 20 capsule 0    doxycycline (VIBRAMYCIN) 100 MG Cap Take 1 capsule (100 mg total) by mouth every 12 (twelve) hours. 20 capsule 0    empagliflozin (JARDIANCE) 25 mg tablet Take 25 mg by mouth once daily.      ferrous sulfate (FEOSOL) Tab tablet Take 1 tablet by mouth daily with breakfast.      flash glucose sensor (FREESTYLE MARC 2 SENSOR) Kit 1 Device by Misc.(Non-Drug; Combo Route) route once daily. E11.59, Z79.4 1 kit 5    insulin aspart U-100 (NOVOLOG) 100 unit/mL injection Inject into the skin 3 (three) times daily before meals.      insulin degludec (TRESIBA FLEXTOUCH U-200) 200 unit/mL (3 mL) insulin pen Inject 46 Units into the skin every evening. 20 mL 3    metoprolol tartrate (LOPRESSOR) 50 MG tablet Take 1 tablet (50 mg total) by mouth 2 (two) times daily.      pantoprazole (PROTONIX) 40 MG tablet Take 40 mg by mouth once daily.      sodium bicarbonate 650 MG tablet Take 1 tablet by mouth every 8 (eight) hours.      XTANDI 40 mg Tab Take 2 tablets by mouth 2 (two) times daily.       No current facility-administered medications on file prior to visit.   [3]   Social History  Socioeconomic History    Marital status: Unknown   Tobacco Use    Smoking status: Never    Smokeless tobacco: Never   Substance and Sexual Activity    Alcohol use: Not Currently    Drug use: Never   Social History Narrative    Lives in [place] with his wife, Naomi. Also with son Kaz and daughter Althea. Previously worked as .      Social Drivers of Health     Financial Resource Strain: Low Risk  (4/18/2025)    Received from [facility]    Overall Financial Resource Strain (CARDIA)     Difficulty of Paying Living Expenses: Not very hard   Food Insecurity: Food Insecurity Present (4/18/2025)    Received from [facility]    Hunger Vital Sign     Worried About Running Out of Food in the Last Year: Never  true     Ran Out of Food in the Last Year: Sometimes true   Transportation Needs: No Transportation Needs (4/18/2025)    Received from [facility]    PRAPARE - Transportation     Lack of Transportation (Medical): No     Lack of Transportation (Non-Medical): No   Physical Activity: Inactive (4/18/2025)    Received from [facility]    Exercise Vital Sign     Days of Exercise per Week: 0 days     Minutes of Exercise per Session: 0 min   Stress: No Stress Concern Present (4/18/2025)    Received from [facility]    [employer] [place] [employer] - Occupational Stress Questionnaire     Feeling of Stress : Not at all   Housing Stability: Unknown (4/18/2025)    Received from [facility]    Housing Stability Vital Sign     Unable to Pay for Housing in the Last Year: No

## 2025-07-30 NOTE — PATIENT INSTRUCTIONS
Shoe purchasing ideas: (try 6pm.[employer], zappos.[employer] , nordstromrack.[employer], or shoes.[employer] for discounted prices) you can visit varsity shoes in [facility] Shoe [facility], DSW shoes in [place]  or zoya rack in the [facility] (there are also several shoe brand outlets in the [facility])    ONLY purchase stability style tennis shoes AVOID flex, foam, free, yoga mat style shoes or shoes that claim to feel like clouds  If you have a flatter foot purchase shoes for PRONATION  If you have a high arch purchase shoes for SUPINATION    Shoe examples:    Asics (GT or gel foundations, gel-kayano-30), new balance stability type shoes (such as the 940 series or the G866s21), saucony (Guide 16, stabil c3),  Aguilar (GTS or Beast or transcend), propet, HokaOne (joss 7, arahi, myranda) Giuliano (tennis shoes and boots)    Sofft Brand (women) Anh&Zafar (men), barekatie, clarks, crocs, aerosoles, naturalizers, SAS, ecco, born, nessa montes, rockports (dress shoes)    Vionic, burkenstocks, fitflops, propet, taos, baretraps, oofos, Hoka or vionic recovery slides/sandals (sandals)    Hoka or vionic recovery slides/sandals, birkenstock rubber sandals, crocs(especially the recovery and support styles), propet. Bared, vionic slippers, PowerStep slippers, Aetrex slippers (house shoes)    Over the Counter Insert Recommendations (always go for FULL length inserts):  - Spenco brand (orthotic are or total support)  - SuperFeet (look for the ones that offer support and/or pain relief)  - Laurel   - PowerStep [place]  - OrthoFeet      Over the counter pain creams: Voltaren Gel, Biofreeze, Bengay, tiger balm, two old goat, lidocaine gel,  Absorbine Veterinary Liniment Gel Topical Analgesic Sore Muscle and Joint Pain Relief    Alternative Pain remedies: Omega-3 EFAs, tumeric with black pepper, green tea, Bromelain, Arnica, garlic    Anti-inflammatory foods  An anti-inflammatory diet should include these foods:  tomatoes  olive oil  green leafy  vegetables, such as spinach, kale, and collards  nuts like almonds and walnuts  fatty fish like salmon, mackerel, tuna, and sardines  fruits such as strawberries, blueberries, cherries, and oranges   Foods that cause inflammation  Try to avoid or limit these foods as much as possible:  refined carbohydrates, such as white bread and pastries  French fries and other fried foods  soda and other sugar-sweetened beverages  red meat (burgers, steaks) and processed meat (hot dogs, sausage)  margarine, shortening, and lard        Recommend lotions: eucerin, eucerin for diabetics, aquaphor, A&D ointment, gold bond for diabetics, sween, Jace's Bees all purpose baby ointment,  urea 40 with aloe or SkinIntegra rapid crack repair (found on amazon.com)  Nail Home remedy: Vicks Vapor rub or Emuaid to nails for easier manageability    How Diabetes Can Affect Your Feet   Diabetes can lead to serious complications in the feet due to its impact on nerves, blood flow, and the immune system. Proper foot care is essential to prevent complications such as ulcers, infections, and amputations.  1. Nerve Damage (Diabetic Neuropathy)   What Happens? High blood sugar damages nerves, leading to loss of sensation in the feet.   Symptoms: Numbness, tingling, burning pain, or complete lack of feeling.  -  Why Its a Problem?   You may not feel cuts, blisters, or injuries, increasing the risk of infection.   Changes in foot shape can lead to pressure points, deformities, and ulcers.    2. Poor Circulation (Peripheral Arterial Disease - PAD)   What Happens? Diabetes causes narrowed or blocked arteries, reducing blood flow to the feet.   Symptoms: Cold feet, slow-healing wounds, pain when walking (claudication).  - Why Its a Problem?   Wounds heal slowly or not at all, increasing infection risk.   Tissue death (gangrene) may occur, leading to amputation.    3. Increased Risk of Infections   What Happens? High blood sugar weakens the immune system,  making infections more severe.   Common Foot Infections:   Skin infections (cellulitis)   Fungal infections (athletes foot, toenail fungus)   Bone infections (osteomyelitis) from untreated ulcers  - Why Its a Problem?   Minor infections can spread quickly.   In severe cases, amputation may be required.    4. Diabetic Foot Ulcers   What Happens? Pressure points, poor sensation, and slow healing lead to open wounds on the foot.   Common Causes:   Ill-fitting shoes (pressure ulcers).   Calluses & corns that break down into wounds.   Injuries that go unnoticed due to neuropathy.  - Why Its a Problem?   Ulcers can become infected and lead to gangrene.   If the infection spreads to bone, amputation may be necessary.    5. Charcot Foot (Diabetic Foot Deformity)   What Happens? Nerve damage leads to unnoticed fractures, causing the foot to collapse over time.   Symptoms: Swelling, redness, warmth, and foot shape changes.  - Why Its a Problem?   Can cause severe deformities, making walking difficult.   Leads to pressure ulcers and infections.    6. Dry Skin & Cracking (Autonomic Neuropathy)   What Happens? Diabetes affects nerves that control sweating, leading to dry skin and cracking.   Symptoms: Peeling, cracking heels, increased calluses.  - Why Its a Problem?   Cracks allow bacteria to enter, increasing infection risk.   Skin breakdown can lead to ulcers.    How to Prevent Foot Complications:  ? Check your feet daily for cuts, redness, or swelling.  ? Moisturize but avoid lotion between toes (prevents fungal growth).  ? Wear well-fitting shoes and avoid walking barefoot.  ? Trim nails carefully (or have a podiatrist trim them if feet have been determined to be high risk).  ? Manage blood sugar levels to reduce nerve damage.  ? See a podiatrist annually or as advised based on exam.         Diabetes: Inspecting Your Feet  Diabetes increases your chances of developing foot problems. So inspect your feet every day. This  helps you find small skin irritations before they become serious infections. If you have trouble seeing the bottoms of your feet, use a mirror or ask a family member or friend to help.     Pressure spots on the bottom of the foot are common areas where problems develop.   How to check your feet  Below are tips to help you look for foot problems. Try to check your feet at the same time each day, such as when you get out of bed in the morning:  Check the top of each foot. The tops of toes, back of the heel, and outer edge of the foot can get a lot of rubbing from poor-fitting shoes.  Check the bottom of each foot. Daily wear and tear often leads to problems at pressure spots.  Check the toes and nails. Fungal infections often occur between toes. Toenail problems can also be a sign of fungal infections or lead to breaks in the skin.  Check your shoes, too. Loose objects inside a shoe can injure the foot. Use your hand to feel inside your shoes for things like kahlil, loose stitching, or rough areas that could irritate your skin.  Warning signs  Look for any color changes in the foot. Redness with streaks can signal a severe infection, which needs immediate medical attention. Tell your doctor right away if you have any of these problems:  Swelling, sometimes with color changes, may be a sign of poor blood flow or infection. Symptoms include tenderness and an increase in the size of your foot.  Warm or hot areas on your feet may be signs of infection. A foot that is cold may not be getting enough blood.  Sensations such as burning, tingling, or pins and needles can be signs of a problem. Also check for areas that may be numb.  Hot spots are caused by friction or pressure. Look for hot spots in areas that get a lot of rubbing. Hot spots can turn into blisters, calluses, or sores.  Cracks and sores are caused by dry or irritated skin. They are a sign that the skin is breaking down, which can lead to infection.  Toenail  problems to watch for include nails growing into the skin (ingrown toenail) and causing redness or pain. Thick, yellow, or discolored nails can signal a fungal infection.  Drainage and odor can develop from untreated sores and ulcers. Call your doctor right away if you notice white or yellow drainage, bleeding, or unpleasant odor.   © 7936-6143 [employer]. [address] [place] [address]. All rights reserved. This information is not intended as a substitute for professional medical care. Always follow your healthcare professional's instructions.        Step-by-Step:  Inspecting Your Feet (Diabetes)    Date Last Reviewed: 10/1/2016  © 6131-3905 [employer]. [address] [place] [address]. All rights reserved. This information is not intended as a substitute for professional medical care. Always follow your healthcare professional's instructions.

## 2025-08-05 ENCOUNTER — OFFICE VISIT (OUTPATIENT)
Dept: FAMILY MEDICINE | Facility: CLINIC | Age: 70
End: 2025-08-05
Payer: MEDICARE

## 2025-08-05 VITALS
OXYGEN SATURATION: 97 % | BODY MASS INDEX: 34.41 KG/M2 | TEMPERATURE: 98 F | SYSTOLIC BLOOD PRESSURE: 116 MMHG | HEART RATE: 72 BPM | DIASTOLIC BLOOD PRESSURE: 72 MMHG | HEIGHT: 69 IN

## 2025-08-05 DIAGNOSIS — L02.213 ABSCESS OF CHEST WALL: Primary | ICD-10-CM

## 2025-08-05 PROCEDURE — 10060 I&D ABSCESS SIMPLE/SINGLE: CPT | Mod: HCNC,S$GLB,,

## 2025-08-05 PROCEDURE — 3288F FALL RISK ASSESSMENT DOCD: CPT | Mod: CPTII,HCNC,S$GLB,

## 2025-08-05 PROCEDURE — 4010F ACE/ARB THERAPY RXD/TAKEN: CPT | Mod: CPTII,HCNC,S$GLB,

## 2025-08-05 PROCEDURE — 99213 OFFICE O/P EST LOW 20 MIN: CPT | Mod: HCNC,25,S$GLB,

## 2025-08-05 PROCEDURE — 1159F MED LIST DOCD IN RCRD: CPT | Mod: CPTII,HCNC,S$GLB,

## 2025-08-05 PROCEDURE — 99999 PR PBB SHADOW E&M-EST. PATIENT-LVL V: CPT | Mod: PBBFAC,HCNC,,

## 2025-08-05 PROCEDURE — 3008F BODY MASS INDEX DOCD: CPT | Mod: CPTII,HCNC,S$GLB,

## 2025-08-05 PROCEDURE — 3044F HG A1C LEVEL LT 7.0%: CPT | Mod: CPTII,HCNC,S$GLB,

## 2025-08-05 PROCEDURE — 1101F PT FALLS ASSESS-DOCD LE1/YR: CPT | Mod: CPTII,HCNC,S$GLB,

## 2025-08-05 PROCEDURE — 3078F DIAST BP <80 MM HG: CPT | Mod: CPTII,HCNC,S$GLB,

## 2025-08-05 PROCEDURE — 1160F RVW MEDS BY RX/DR IN RCRD: CPT | Mod: CPTII,HCNC,S$GLB,

## 2025-08-05 PROCEDURE — 87070 CULTURE OTHR SPECIMN AEROBIC: CPT | Mod: HCNC

## 2025-08-05 PROCEDURE — 1126F AMNT PAIN NOTED NONE PRSNT: CPT | Mod: CPTII,HCNC,S$GLB,

## 2025-08-05 PROCEDURE — 3074F SYST BP LT 130 MM HG: CPT | Mod: CPTII,HCNC,S$GLB,

## 2025-08-05 RX ORDER — LIDOCAINE HYDROCHLORIDE AND EPINEPHRINE 10; 10 UG/ML; MG/ML
2 INJECTION, SOLUTION INFILTRATION; PERINEURAL
Status: DISCONTINUED | OUTPATIENT
Start: 2025-08-05 | End: 2025-08-06

## 2025-08-05 RX ORDER — MUPIROCIN 20 MG/G
OINTMENT TOPICAL 2 TIMES DAILY
Qty: 15 G | Refills: 1 | Status: SHIPPED | OUTPATIENT
Start: 2025-08-05 | End: 2025-08-12

## 2025-08-05 RX ORDER — DOXYCYCLINE 100 MG/1
100 CAPSULE ORAL EVERY 12 HOURS
Qty: 14 CAPSULE | Refills: 0 | Status: SHIPPED | OUTPATIENT
Start: 2025-08-05 | End: 2025-08-12

## 2025-08-05 RX ADMIN — LIDOCAINE HYDROCHLORIDE AND EPINEPHRINE 1 ML: 10; 20 INJECTION, SOLUTION INFILTRATION; PERINEURAL at 04:08

## 2025-08-05 NOTE — PROGRESS NOTES
HPI     Rodrick Pearl is a 70 y.o. male with multiple medical diagnoses as listed in the medical history and problem list that presents for skin infection to middle chest wall. PCP Dr. Hager with last visit in this clinic on 3/21/25.     Chief Complaint   Patient presents with    Recurrent Skin Infections     Upper chest     HPI    CHIEF COMPLAINT:  Patient presents with a boil on his chest.    HPI:  Patient reports a boil on his chest that appeared approximately 5 days ago and has been increasing in size. He describes it as significantly painful and tender. He has not applied any ointment or medication to the area. Patient reports a history of similar boils, including one under his arm years ago which resolved after self-drainage, and one on his neck approximately 40 years ago requiring a minor surgical procedure for drainage. Patient denies any recent occurrences of boils in the past couple of months, fevers, or having a pacemaker, port, or any implanted devices in the skin near the affected area.    MEDICAL HISTORY:  Patient has a history of boils. He experienced boils under his arm years ago and on his neck approximately 40 years ago.    SURGICAL HISTORY:  Patient underwent a boil drainage procedure approximately 40 years ago. This one-day surgery was performed to address a boil on his neck.    SOCIAL HISTORY:  Marital status:  for 47 years       Assessment & Plan     1. Abscess of chest wall (see picture below)     Patient reports a boil on the chest that appeared last Friday and has been progressively enlarging over the past few days without fever.   Examination revealed a firm abscess with a point resembling a blackhead, indicating it is coming to a head.   Treatment options were discussed including incision and drainage versus oral antibiotics with warm compresses.   Plan is to proceed with incision and drainage today under local anesthesia.   Photograph of the boil was taken for medical record  documentation.    Patient tolerated I&D well; moderate amount of yellow and black purulent, viscous material expelled. Wound culture obtained and sent to lab for testing. Small amount of 1/4 inch gauze packing applied. Clean dry dressing placed to chest wall. Encouraged to remove dresssing tomorrow and shower as normal; apply mupirocin and clean bandage daily. Discussed that it is likely to continue to drain over the next few days, and to change dressing as needed. Will prescribe doxycycline BID x 7 days to prevent infection. Will schedule one week follow up for reassessment. Encouraged to monitor for any fever, worsening pain at incision site.   Follow up with clinic for any worsening symptoms, or if symptoms fail to improve.       - Aerobic culture  - doxycycline (VIBRAMYCIN) 100 MG Cap; Take 1 capsule (100 mg total) by mouth every 12 (twelve) hours. for 7 days  Dispense: 14 capsule; Refill: 0  - mupirocin (BACTROBAN) 2 % ointment; Apply topically 2 (two) times daily. for 7 days  Dispense: 15 g; Refill: 1  - Incision & Drainage  - LIDOcaine 2%/EPINEPHrine 1:100,000 injection 1 mL            Discussed DDx, condition, and treatment.   Education sent to patient portal/included in after visit summary.  ED precautions given.   Notify provider if symptoms do not resolve or increase in severity.   Patient verbalizes understanding and agrees with plan of care.  --------------------------------------------      Health Maintenance:  Health Maintenance         Date Due Completion Date    Diabetes Urine Screening Never done ---    Diabetic Eye Exam Never done ---    TETANUS VACCINE Never done ---    Shingles Vaccine (1 of 2) Never done ---    Colorectal Cancer Screening Never done ---    RSV Vaccine (Age 60+ and Pregnant patients) (1 - Risk 60-74 years 1-dose series) Never done ---    COVID-19 Vaccine (5 - 2024-25 season) 01/06/2025 11/11/2024    Influenza Vaccine (1) 09/01/2025 10/7/2024    Hemoglobin A1c 09/21/2025  3/21/2025    Lipid Panel 03/21/2026 3/21/2025    Foot Exam 07/30/2026 7/30/2025    High Dose Statin 08/06/2026 8/6/2025            Discussed the importance of overdue vaccines which were offered during this encounter. Patient declined overdue vaccines at this time and Advised patient on the importance of completing overdue health maintenance items    Follow Up:  Follow up in about 1 week (around 8/12/2025), or if symptoms worsen or fail to improve, for re-evaluation.    Exam     Review of Systems:  (as noted above)  Review of Systems   Constitutional:  Negative for fever.   HENT:  Negative for trouble swallowing.    Respiratory:  Negative for shortness of breath.    Cardiovascular:  Negative for chest pain.   Gastrointestinal:  Negative for blood in stool.   Skin:  Positive for color change and wound.       Physical Exam:   Physical Exam  Constitutional:       General: He is not in acute distress.     Appearance: Normal appearance. He is obese. He is not ill-appearing.   HENT:      Head: Normocephalic and atraumatic.   Cardiovascular:      Rate and Rhythm: Normal rate and regular rhythm.      Pulses: Normal pulses.      Heart sounds: Normal heart sounds. No murmur heard.  Pulmonary:      Effort: Pulmonary effort is normal. No respiratory distress.      Breath sounds: Normal breath sounds. No wheezing.   Skin:     General: Skin is warm and dry.      Capillary Refill: Capillary refill takes less than 2 seconds.      Findings: Abscess present.             Comments: 1.5 cm x 1.5 cm raised, erythematous, tender cutaneous abscess to middle upper chest wall.   Neurological:      General: No focal deficit present.      Mental Status: He is alert and oriented to person, place, and time.      Gait: Gait abnormal (using power wheelchair).   Psychiatric:         Mood and Affect: Mood normal.         Behavior: Behavior normal.       Vitals:    08/05/25 1530   BP: 116/72   Pulse: 72   Temp: 97.5 °F (36.4 °C)   TempSrc: Oral  "  SpO2: 97%   Height: 5' 9" (1.753 m)      Body mass index is 34.41 kg/m².          History     Past Medical History:  Past Medical History:   Diagnosis Date    Diabetes mellitus     Hypertension        Past Surgical History:  Past Surgical History:   Procedure Laterality Date    ENDOSCOPY OF PROXIMAL SMALL INTESTINE N/A 6/3/2024    Procedure: ENTEROSCOPY, PROXIMAL;  Surgeon: Kj Zee MD;  Location: [facility];  Service: Endoscopy;  Laterality: N/A;  upper SBE    FEMUR FRACTURE SURGERY Right     TOE AMPUTATION Left     Great toe    TOE AMPUTATION Right     little toe    TONSILLECTOMY      TOTAL KNEE ARTHROPLASTY Right        Social History:  Social History[1]    Family History:  Family History   Problem Relation Name Age of Onset    Lung cancer Father      Kidney cancer Sister      Breast cancer Sister radha     Prostate cancer Paternal Uncle      Lung cancer Paternal Uncle      Pancreatic cancer Paternal Aunt         Allergies and Medications: (updated and reviewed)  Review of patient's allergies indicates:   Allergen Reactions    Netarsudil Other (See Comments), Itching and Swelling     To eyes     Current Medications[2]    Patient Care Team:  Edgar Hager MD as PCP - General (Internal Medicine)  Sasha Mccoy RN as Oncology Navigator (Surgery)  Johnny Harris MD as Consulting Physician (Hematology and Oncology)       - The patient is given an After Visit Summary that lists all medications with directions, allergies, education, orders placed during this encounter and follow-up instructions.      - I have reviewed the patient's medical information including past medical, family, and social history sections including the medications and allergies.      - We discussed the patient's current medications.     This note was created by combination of typed  and MModal dictation.  Transcription errors may be present.  If there are any questions, please contact me.     This note was " generated with the assistance of ambient listening technology. Verbal consent was obtained by the patient and accompanying visitor(s) for the recording of patient appointment to facilitate this note. I attest to having reviewed and edited the generated note for accuracy, though some syntax or spelling errors may persist. Please contact the author of this note for any clarification.                  JJ Velasquez         [1]   Social History  Socioeconomic History    Marital status: Unknown   Tobacco Use    Smoking status: Never    Smokeless tobacco: Never   Substance and Sexual Activity    Alcohol use: Not Currently    Drug use: Never   Social History Narrative    Lives in [place] with his wife, Naomi. Also with son Kaz and daughter Althea. Previously worked as .      Social Drivers of Health     Financial Resource Strain: Low Risk  (4/18/2025)    Received from [facility]    Overall Financial Resource Strain (CARDIA)     Difficulty of Paying Living Expenses: Not very hard   Food Insecurity: Food Insecurity Present (4/18/2025)    Received from [facility]    Hunger Vital Sign     Worried About Running Out of Food in the Last Year: Never true     Ran Out of Food in the Last Year: Sometimes true   Transportation Needs: No Transportation Needs (4/18/2025)    Received from [facility]    PRAPARE - Transportation     Lack of Transportation (Medical): No     Lack of Transportation (Non-Medical): No   Physical Activity: Inactive (4/18/2025)    Received from [facility]    Exercise Vital Sign     Days of Exercise per Week: 0 days     Minutes of Exercise per Session: 0 min   Stress: No Stress Concern Present (4/18/2025)    Received from [facility]    [employer] - Occupational Stress Questionnaire     Feeling of Stress : Not at all   Housing Stability: Unknown (4/18/2025)    Received from [facility]    Housing Stability Vital Sign     Unable to Pay for Housing in the  Last Year: No   [2]   Current Outpatient Medications   Medication Sig Dispense Refill    amLODIPine (NORVASC) 10 MG tablet Take 1 tablet (10 mg total) by mouth once daily. 90 tablet 3    aspirin (ECOTRIN) 81 MG EC tablet Take 81 mg by mouth once daily.      atorvastatin (LIPITOR) 10 MG tablet Take 10 mg by mouth once daily.      blood-glucose sensor (FREESTYLE MARC 2 PLUS SENSOR) Chen 1 Device by Misc.(Non-Drug; Combo Route) route once daily. 3 each 11    brimonidine 0.1% (ALPHAGAN P) 0.1 % Drop Place 1 drop into both eyes 3 (three) times daily.      dorzolamide (TRUSOPT) 2 % ophthalmic solution 1 drop 3 (three) times daily.      doxazosin (CARDURA) 2 MG tablet Take 2 mg by mouth every evening.      empagliflozin (JARDIANCE) 25 mg tablet Take 25 mg by mouth once daily.      ferrous sulfate (FEOSOL) Tab tablet Take 1 tablet by mouth daily with breakfast.      flash glucose sensor (FREESTYLE MARC 2 SENSOR) Kit 1 Device by Misc.(Non-Drug; Combo Route) route once daily. E11.59, Z79.4 1 kit 5    insulin aspart U-100 (NOVOLOG) 100 unit/mL injection Inject into the skin 3 (three) times daily before meals.      insulin degludec (TRESIBA FLEXTOUCH U-200) 200 unit/mL (3 mL) insulin pen Inject 46 Units into the skin every evening. 20 mL 3    metoprolol tartrate (LOPRESSOR) 50 MG tablet Take 1 tablet (50 mg total) by mouth 2 (two) times daily.      pantoprazole (PROTONIX) 40 MG tablet Take 40 mg by mouth once daily.      sodium bicarbonate 650 MG tablet Take 1 tablet by mouth every 8 (eight) hours.      doxycycline (VIBRAMYCIN) 100 MG Cap Take 1 capsule (100 mg total) by mouth every 12 (twelve) hours. for 7 days 14 capsule 0    mupirocin (BACTROBAN) 2 % ointment Apply topically 2 (two) times daily. for 7 days 15 g 1    XTANDI 40 mg Tab Take 2 tablets by mouth 2 (two) times daily.       No current facility-administered medications for this visit.

## 2025-08-05 NOTE — PROCEDURES
"Incision & Drainage    Date/Time: 8/5/2025 3:30 PM    Performed by: Destiny Patel FNP  Authorized by: Destiny Patel FNP    Time out: Immediately prior to procedure a "time out" was called to verify the correct patient, procedure, equipment, support staff and site/side marked as required.      Type:  Abscess  Body area:  Trunk  Location details:  Chest  Anesthesia:  Local infiltration  Local anesthetic: Lidocaine 1% with epinephrine  Anesthetic total (ml):  2  Complexity:  Simple  Drainage:  Pus, serosanguinous and viscous  Drainage amount:  Moderate  Wound treatment:  Incision, drainage, expression of material and wound packed  Packing material:  1/4 in gauze  Patient tolerance:  Patient tolerated the procedure well with no immediate complications    "

## 2025-08-06 RX ORDER — LIDOCAINE HYDROCHLORIDE AND EPINEPHRINE 10; 20 UG/ML; MG/ML
1 INJECTION, SOLUTION INFILTRATION; PERINEURAL
Status: DISCONTINUED | OUTPATIENT
Start: 2025-08-05 | End: 2025-08-06

## 2025-08-06 RX ORDER — LIDOCAINE HYDROCHLORIDE AND EPINEPHRINE 10; 20 UG/ML; MG/ML
1 INJECTION, SOLUTION INFILTRATION; PERINEURAL
Status: COMPLETED | OUTPATIENT
Start: 2025-08-05 | End: 2025-08-05

## 2025-08-07 ENCOUNTER — TELEPHONE (OUTPATIENT)
Dept: FAMILY MEDICINE | Facility: CLINIC | Age: 70
End: 2025-08-07
Payer: MEDICARE

## 2025-08-07 NOTE — TELEPHONE ENCOUNTER
Called to check on patient; he is feeling well with no fevers. Taking antibiotics as ordered and changing dressing to I&D site per order. Healing well, redness resolving. Will see next week for follow up.

## 2025-08-08 LAB — BACTERIA SPEC AEROBE CULT: NORMAL

## 2025-08-12 ENCOUNTER — OFFICE VISIT (OUTPATIENT)
Dept: FAMILY MEDICINE | Facility: CLINIC | Age: 70
End: 2025-08-12
Payer: MEDICARE

## 2025-08-12 ENCOUNTER — LAB VISIT (OUTPATIENT)
Dept: LAB | Facility: HOSPITAL | Age: 70
End: 2025-08-12
Attending: INTERNAL MEDICINE
Payer: MEDICARE

## 2025-08-12 VITALS
HEART RATE: 65 BPM | OXYGEN SATURATION: 96 % | SYSTOLIC BLOOD PRESSURE: 114 MMHG | BODY MASS INDEX: 34.41 KG/M2 | HEIGHT: 69 IN | DIASTOLIC BLOOD PRESSURE: 72 MMHG | TEMPERATURE: 98 F

## 2025-08-12 DIAGNOSIS — E11.59 TYPE 2 DIABETES MELLITUS WITH OTHER CIRCULATORY COMPLICATION, WITH LONG-TERM CURRENT USE OF INSULIN: ICD-10-CM

## 2025-08-12 DIAGNOSIS — I10 PRIMARY HYPERTENSION: ICD-10-CM

## 2025-08-12 DIAGNOSIS — E11.9 TYPE 2 DIABETES MELLITUS WITHOUT COMPLICATION: ICD-10-CM

## 2025-08-12 DIAGNOSIS — Z12.11 COLON CANCER SCREENING: ICD-10-CM

## 2025-08-12 DIAGNOSIS — L02.213 ABSCESS OF CHEST WALL: Primary | ICD-10-CM

## 2025-08-12 DIAGNOSIS — Z79.4 TYPE 2 DIABETES MELLITUS WITH OTHER CIRCULATORY COMPLICATION, WITH LONG-TERM CURRENT USE OF INSULIN: ICD-10-CM

## 2025-08-12 LAB
ALBUMIN/CREAT UR: 954.3 UG/MG
CREAT UR-MCNC: 46 MG/DL (ref 23–375)
MICROALBUMIN UR-MCNC: 439 UG/ML (ref ?–5000)

## 2025-08-12 PROCEDURE — 99999 PR PBB SHADOW E&M-EST. PATIENT-LVL IV: CPT | Mod: PBBFAC,HCNC,,

## 2025-08-12 PROCEDURE — 82043 UR ALBUMIN QUANTITATIVE: CPT | Mod: HCNC

## 2025-09-02 DIAGNOSIS — Z79.4 TYPE 2 DIABETES MELLITUS WITH OTHER CIRCULATORY COMPLICATION, WITH LONG-TERM CURRENT USE OF INSULIN: ICD-10-CM

## 2025-09-02 DIAGNOSIS — E11.59 TYPE 2 DIABETES MELLITUS WITH OTHER CIRCULATORY COMPLICATION, WITH LONG-TERM CURRENT USE OF INSULIN: ICD-10-CM

## 2025-09-03 RX ORDER — SODIUM BICARBONATE 650 MG/1
TABLET ORAL
Refills: 0 | OUTPATIENT
Start: 2025-09-03

## 2025-09-03 RX ORDER — FLASH GLUCOSE SENSOR
KIT MISCELLANEOUS
Refills: 0 | OUTPATIENT
Start: 2025-09-03

## 2025-09-04 DIAGNOSIS — E11.59 TYPE 2 DIABETES MELLITUS WITH OTHER CIRCULATORY COMPLICATION, WITH LONG-TERM CURRENT USE OF INSULIN: ICD-10-CM

## 2025-09-04 DIAGNOSIS — Z79.4 TYPE 2 DIABETES MELLITUS WITH OTHER CIRCULATORY COMPLICATION, WITH LONG-TERM CURRENT USE OF INSULIN: ICD-10-CM

## 2025-09-04 RX ORDER — INSULIN DEGLUDEC 200 U/ML
46 INJECTION, SOLUTION SUBCUTANEOUS
Qty: 27 ML | Refills: 0 | Status: SHIPPED | OUTPATIENT
Start: 2025-09-04